# Patient Record
Sex: FEMALE | Race: BLACK OR AFRICAN AMERICAN | NOT HISPANIC OR LATINO | Employment: FULL TIME | URBAN - METROPOLITAN AREA
[De-identification: names, ages, dates, MRNs, and addresses within clinical notes are randomized per-mention and may not be internally consistent; named-entity substitution may affect disease eponyms.]

---

## 2020-05-17 ENCOUNTER — OFFICE VISIT (OUTPATIENT)
Dept: URGENT CARE | Facility: CLINIC | Age: 45
End: 2020-05-17
Payer: COMMERCIAL

## 2020-05-17 VITALS
OXYGEN SATURATION: 100 % | BODY MASS INDEX: 26.29 KG/M2 | SYSTOLIC BLOOD PRESSURE: 117 MMHG | DIASTOLIC BLOOD PRESSURE: 112 MMHG | HEART RATE: 75 BPM | HEIGHT: 64 IN | WEIGHT: 154 LBS | TEMPERATURE: 99 F

## 2020-05-17 DIAGNOSIS — L25.9 CONTACT DERMATITIS, UNSPECIFIED CONTACT DERMATITIS TYPE, UNSPECIFIED TRIGGER: Primary | ICD-10-CM

## 2020-05-17 DIAGNOSIS — U07.1 COVID-19: ICD-10-CM

## 2020-05-17 PROCEDURE — U0003 INFECTIOUS AGENT DETECTION BY NUCLEIC ACID (DNA OR RNA); SEVERE ACUTE RESPIRATORY SYNDROME CORONAVIRUS 2 (SARS-COV-2) (CORONAVIRUS DISEASE [COVID-19]), AMPLIFIED PROBE TECHNIQUE, MAKING USE OF HIGH THROUGHPUT TECHNOLOGIES AS DESCRIBED BY CMS-2020-01-R: HCPCS

## 2020-05-17 PROCEDURE — 99214 PR OFFICE/OUTPT VISIT, EST, LEVL IV, 30-39 MIN: ICD-10-PCS | Mod: S$GLB,,, | Performed by: EMERGENCY MEDICINE

## 2020-05-17 PROCEDURE — 99214 OFFICE O/P EST MOD 30 MIN: CPT | Mod: S$GLB,,, | Performed by: EMERGENCY MEDICINE

## 2020-05-17 RX ORDER — HYDROCORTISONE 25 MG/G
CREAM TOPICAL 2 TIMES DAILY
Qty: 28 G | Refills: 0 | Status: SHIPPED | OUTPATIENT
Start: 2020-05-17 | End: 2020-05-17 | Stop reason: SDUPTHER

## 2020-05-17 RX ORDER — HYDROCORTISONE 25 MG/G
CREAM TOPICAL 2 TIMES DAILY
Qty: 28 G | Refills: 0 | Status: SHIPPED | OUTPATIENT
Start: 2020-05-17 | End: 2020-05-26 | Stop reason: ALTCHOICE

## 2020-05-17 RX ORDER — HYDROXYZINE HYDROCHLORIDE 50 MG/1
50 TABLET, FILM COATED ORAL 3 TIMES DAILY PRN
Qty: 21 TABLET | Refills: 0 | Status: SHIPPED | OUTPATIENT
Start: 2020-05-17 | End: 2020-05-17 | Stop reason: SDUPTHER

## 2020-05-17 RX ORDER — HYDROXYZINE HYDROCHLORIDE 50 MG/1
50 TABLET, FILM COATED ORAL 3 TIMES DAILY PRN
Qty: 21 TABLET | Refills: 0 | Status: SHIPPED | OUTPATIENT
Start: 2020-05-17 | End: 2020-05-24

## 2020-05-17 RX ORDER — METHYLPREDNISOLONE 4 MG/1
TABLET ORAL
Qty: 1 PACKAGE | Refills: 0 | Status: SHIPPED | OUTPATIENT
Start: 2020-05-17 | End: 2023-08-10 | Stop reason: ALTCHOICE

## 2020-05-17 NOTE — LETTER
[address] [place] [address] ? [place], [address] ? Phone [phone] ? Fax [phone] ? [email or web address]          Return to Work/School    Patient: Lluvia Sheehan  YOB: 1975   Date: 05/17/2020      To Whom It May Concern:     Lluvia Sheehan was in contact with/seen in my office on 05/17/2020. COVID-19 is present in our communities across the state. There is limited testing for COVID at this time, so not all patients can be tested. In this situation, your employee meets the following criteria:     Lluvia Sheehan has met the criteria for COVID-19 testing based upon symptoms, travel, and/or potential exposure. The test has been completed and is pending results at this time. During this time the employee is not able to work and should be quarantined per the Centers for Disease Control timelines.      If you have any questions or concerns, or if I can be of further assistance, please do not hesitate to contact me.     Sincerely,      Pedro Nino MD

## 2020-05-17 NOTE — PATIENT INSTRUCTIONS
"WASH THE AREAS WITH ANTIBACTERIAL SOAP LIKE DIAL AND WATER  HYDROXYZINE PRESCRIPTION FOR ITCHING  HYDROCORTISONE 2.5% CREAM PRESCRIPTION TO AFFECTED AREAS  COVID SWAB PENDING  WORK NOTE GIVEN  DO NOT FILL THE MEDROL DOSEPAK UNLESS COVID-19 SWAB IS NEGATIVE AS THE USE OF STEROIDS WITH A POSITIVE COVID-19 TEST CAN BE DETRIMENTAL TO HER HEALTH AND MAKE THINGS WORSE  ONLY FILL THE MEDROL PACK IF COVID IN SWAB NEGATIVE  REVIEW CONTACT DERMATITIS SHEET  REVIEW COVID SHEET  FOLLOW-UP WITH PCP.      Contact Dermatitis  Contact dermatitis is a skin rash caused by something that touches the skin and makes it irritated and inflamed. Your skin may be red, swollen, dry, and may be cracked. Blisters may form and ooze. The rash will itch.  Contact dermatitis can form on the face and neck, backs of hands, forearms, genitals, and lower legs.  People can get contact dermatitis from lots of sources. These include:  · Plants such as poison ivy, oak, or sumac  · Chemicals in hair dyes and rinses, soaps, solvents, waxes, fingernail polish, and deodorants   · Jewelry or watchbands made of nickel  Contact dermatitis is not passed from person to person.  Talk with your healthcare provider about what may have caused the rash. A type of allergy testing called "patch testing" may be used to discover what you are allergic to. You will need to avoid the source of your rash in the future to prevent it from coming back.  Treatment is done to relieve itching and prevent the rash from coming back. The rash should go away in a few days to a few weeks.  Home care  Your healthcare provider may prescribe medicine to relieve swelling and itching. Follow all instructions when using these medicines.  General care:  · Avoid anything that heats up your skin, such as hot showers or baths, or direct sunlight. This can make itching worse.  · Apply cold compresses to soothe your sores to help relieve your symptoms. Do this for 30 minutes 3 to 4 times a day. You " can make a cold compress by soaking a cloth in cold water. Squeeze out excess water. You can add colloidal oatmeal to the water to help reduce itching. For severe itching in a small area, apply an ice pack wrapped in a thin towel. Do this for 20 minutes 3 to 4 times a day.  · You can also try wet dressings. One way to do this is to wear a wet piece of clothing under a dry one. Wear a damp shirt under a dry shirt if your upper body is affected. This can relieve itching and prevent you from scratching the affected area.  · You can also help relieve large areas of itching by taking a lukewarm bath with colloidal oatmeal added to the water.  · Use hydrocortisone cream for redness and irritation, unless another medicine was prescribed. You can also use benzocaine anesthetic cream or spray. Calamine lotion can also relieve mild symptoms.  · Use oral diphenhydramine to help reduce itching. You can buy this antihistamine at drug and grocery stores. It can make you sleepy, so use lower doses during the daytime. Or you can use loratadine. This is an antihistamine that will not make you sleepy. Do not use diphenhydramine if you have glaucoma or have trouble urinating due to an enlarged prostate.  · If a plant causes your rash, make sure to wash your skin and the clothes you were wearing when you came into contact with the plant. This is to wash away the plant oils that gave you the rash and prevent more or worse symptoms.  · Stay away from the substance or object that causes your symptoms. If you cant avoid it, wear gloves or some other type of protection.  Follow-up care  Follow up with your healthcare provider, or as advised.  When to seek medical advice  Call your healthcare provider right away if any of these occur:  · Spreading of the rash to other parts of your body  · Severe swelling of your face, eyelids, mouth, throat or tongue  · Trouble urinating due to swelling in the genital area  · Fever of 100.4°F (38°C) or  higher  · Redness or swelling that gets worse  · Pain that gets worse  · Foul-smelling fluid leaking from the skin  · Yellow-brown crusts on the open blisters  Date Last Reviewed: 9/1/2016  © 7409-3707 [employer]. [address] [place] [address], [place] [address]. All rights reserved. This information is not intended as a substitute for professional medical care. Always follow your healthcare professional's instructions.        Nonspecific Dermatitis  Dermatitis is a skin rash caused by something that touches the skin and makes it irritated and inflamed.  Your skin may be red, swollen, dry, and may be cracked. Blisters may form and ooze. The rash will itch.  Dermatitis can form on the face and neck, backs of hands, forearms, genitals, and lower legs. Dermatitis is not passed from person to person.  Talk with your health care provider about what may have caused the rash. Common things that cause skin allergies are metal in jewelry, plants like poison ivy or poison oak, and certain skin care products. You will need to avoid the source of your rash in the future to prevent it from coming back. In some cases, the cause of the dermatitis may not be found.  Treatment is done to relieve itching and prevent the rash from coming back. The rash should go away in a few days to a few weeks.  Home care  The health care provider may prescribe medications to relieve swelling and itching. Follow all instructions when using these medications.  · Avoid anything that heats up your skin, such as hot showers or baths, or direct sunlight. This can make itching worse.  · Stay away from whatever you think caused the rash.  · Bathe in warm, not hot, water. Apply a moisturizing lotion after bathing to prevent dry skin.  · Avoid skin irritants such as wool or silk clothing, grease, oils, harsh soaps, and detergents.  · Apply cold compresses to soothe your sores to help relieve your symptoms. Do this for 30 minutes 3 to 4 times a day.  You can make a cold compress by soaking a cloth in cold water. Squeeze out excess water. You can add colloidal oatmeal to the water to help reduce itching. For severe itching in a small area, apply an ice pack wrapped in a thin towel. Do this for 20 minutes 3 to 4 times a day.  · You can also help relieve large areas of itching by taking a lukewarm bath with colloidal oatmeal added to the water.  · Use hydrocortisone cream for redness and irritation, unless another medicine was prescribed. You can also use benzocaine anesthetic cream or spray.  · Use oral diphenhydramine to help reduce itching. This is an antihistamine you can buy at drug and grocery stores. It can make you sleepy, so use lower doses during the daytime. Or you can use loratadine. This is an antihistamine that will not make you sleepy. Dont use diphenhydramine if you have glaucoma or have trouble urinating because of an enlarged prostate.  · Wash your hands or use an antibacterial gel often to prevent the spread of the rash.  Follow-up care  Follow up with your health care provider. Make an appointment with your health care provider if your symptoms do not get better in the next 1 to 2 weeks.  When to seek medical advice  Call your health care provider right away if any of these occur:  · Spreading of the rash to other parts of your body  · Severe swelling of your face, eyelids, mouth, throat or tongue  · Trouble urinating due to swelling in the genital area  · Fever of 100.4°F (38°C) or higher  · Redness or swelling that gets worse  · Pain that gets worse  · Foul-smelling fluid leaking from the skin  · Yellow-brown crusts on the open blisters  · Joint pain   Date Last Reviewed: 7/23/2014  © 7229-5657 [employer] [address] [place] [address], [place] [address]. All rights reserved. This information is not intended as a substitute for professional medical care. Always follow your healthcare professional's instructions.      Guidelines for  General Prevention of COVID-19    o Take steps to protect yourself from COVID-19. Perform hand hygiene frequently. Wash your hands often with soap and water for at least 20 seconds of use and alcohol-based hand , covering all surfaces of your hands and rubbing them together until they feel dry.  o Avoid touching your eyes, nose, and mouth with unwashed hands.  o Avoid close contact with people and stay home if youre sick, except to get medical care.   o Cover coughs and sneezes with a tissue, or use the inside of your elbow. Immediately wash your hands or use hand .     For more information, see CDC link below:    https://www.cdc.gov/coronavirus/2019-ncov/hcp/guidance-prevent-spread.html#precautions

## 2020-05-17 NOTE — PROGRESS NOTES
"Subjective:       Patient ID: Lluvia Sheehan is a 45 y.o. female.    Vitals:  height is 5' 4" (1.626 m) and weight is 69.9 kg (154 lb). Her temperature is 99.2 °F (37.3 °C). Her blood pressure is 117/112 (abnormal) and her pulse is 75. Her oxygen saturation is 100%.     Chief Complaint: Rash    PATIENT IS A 45-YEAR-OLD FEMALE WHO WORKS AT THE [employer] AT THE [employer] PRESENTING WITH ITCHING IN SEVERAL SPOTS TO THE RIGHT EAR, LEFT FOREARM, RIGHT FOREARM.  NO SHORTNESS OF BREATH NO WHEEZING NO SWELLING OR THROAT CLOSING.  THE PATIENT IS NOT CHANGED ANYTHING IN HER NORMAL ROUTINE.  SHE DOES WANT TO BE TESTED FOR COVID-19.  NO VESICLES NO DERMATOMAL  DISTRIBUTION.  RASH IS NOT PAINFUL    Rash   This is a new problem. The affected locations include the right ear. The rash is characterized by redness and itchiness. She was exposed to nothing. Pertinent negatives include no cough, fever or sore throat.       Constitution: Negative for chills and fever.   HENT: Negative for facial swelling and sore throat.    Neck: Negative for painful lymph nodes.   Eyes: Negative for eye itching and eyelid swelling.   Respiratory: Negative for cough.    Musculoskeletal: Negative for joint pain and joint swelling.   Skin: Positive for rash and erythema. Negative for color change, pale, wound, abrasion, laceration, lesion, skin thickening/induration, puncture wound, bruising, abscess, avulsion and hives.   Allergic/Immunologic: Negative for environmental allergies, immunocompromised state and hives.   Hematologic/Lymphatic: Negative for swollen lymph nodes.       Objective:      Physical Exam   Constitutional: She is oriented to person, place, and time. She appears well-developed and well-nourished.   HENT:   Head: Normocephalic and atraumatic. Head is without abrasion, without contusion and without laceration.   Left Ear: External ear normal.   Nose: Nose normal.   Mouth/Throat: Oropharynx is clear and moist and mucous membranes are normal. "   MILD ERYTHEMA/PRURITIS RIGHT UPPER EARLOBE  LEFT FOREARM WITH RAISED PRURITIC ERYTHEMATOUS AREA ABOUT 2-3 CM IN SIZE. NO VESICLES, NO CELLULITIS,   Eyes: Pupils are equal, round, and reactive to light. Conjunctivae, EOM and lids are normal.   Neck: Trachea normal, full passive range of motion without pain and phonation normal. Neck supple.   Cardiovascular: Normal rate, regular rhythm and normal heart sounds.   Pulmonary/Chest: Effort normal and breath sounds normal. No stridor. No respiratory distress.   Musculoskeletal: Normal range of motion.   Neurological: She is alert and oriented to person, place, and time.   Skin: Skin is warm, dry, intact and rash. Capillary refill takes less than 2 seconds. Lesions:  erythemaabrasion, burn, bruising and ecchymosis  Psychiatric: She has a normal mood and affect. Her speech is normal. Cognition and memory are normal.   Nursing note and vitals reviewed.        Assessment:       1. Contact dermatitis, unspecified contact dermatitis type, unspecified trigger    2. COVID-19        Plan:         Contact dermatitis, unspecified contact dermatitis type, unspecified trigger    COVID-19  -     COVID-19 Routine Screening    Other orders  -     Discontinue: hydrOXYzine (ATARAX) 50 MG tablet; Take 1 tablet (50 mg total) by mouth 3 (three) times daily as needed for Itching.  Dispense: 21 tablet; Refill: 0  -     Discontinue: hydrocortisone 2.5 % cream; Apply topically 2 (two) times daily. for 10 days  Dispense: 28 g; Refill: 0  -     methylPREDNISolone (MEDROL DOSEPACK) 4 mg tablet; use as directed on package until gone  Dispense: 1 Package; Refill: 0  -     hydrocortisone 2.5 % cream; Apply topically 2 (two) times daily. for 10 days  Dispense: 28 g; Refill: 0  -     hydrOXYzine (ATARAX) 50 MG tablet; Take 1 tablet (50 mg total) by mouth 3 (three) times daily as needed for Itching.  Dispense: 21 tablet; Refill: 0         Patient Instructions   WASH THE AREAS WITH ANTIBACTERIAL SOAP  "LIKE DIAL AND WATER  HYDROXYZINE PRESCRIPTION FOR ITCHING  HYDROCORTISONE 2.5% CREAM PRESCRIPTION TO AFFECTED AREAS  COVID SWAB PENDING  WORK NOTE GIVEN  DO NOT FILL THE MEDROL DOSEPAK UNLESS COVID-19 SWAB IS NEGATIVE AS THE USE OF STEROIDS WITH A POSITIVE COVID-19 TEST CAN BE DETRIMENTAL TO HER HEALTH AND MAKE THINGS WORSE  ONLY FILL THE MEDROL PACK IF COVID IN SWAB NEGATIVE  REVIEW CONTACT DERMATITIS SHEET  REVIEW COVID SHEET  FOLLOW-UP WITH PCP.      Contact Dermatitis  Contact dermatitis is a skin rash caused by something that touches the skin and makes it irritated and inflamed. Your skin may be red, swollen, dry, and may be cracked. Blisters may form and ooze. The rash will itch.  Contact dermatitis can form on the face and neck, backs of hands, forearms, genitals, and lower legs.  People can get contact dermatitis from lots of sources. These include:  · Plants such as poison ivy, oak, or sumac  · Chemicals in hair dyes and rinses, soaps, solvents, waxes, fingernail polish, and deodorants   · Jewelry or watchbands made of nickel  Contact dermatitis is not passed from person to person.  Talk with your healthcare provider about what may have caused the rash. A type of allergy testing called "patch testing" may be used to discover what you are allergic to. You will need to avoid the source of your rash in the future to prevent it from coming back.  Treatment is done to relieve itching and prevent the rash from coming back. The rash should go away in a few days to a few weeks.  Home care  Your healthcare provider may prescribe medicine to relieve swelling and itching. Follow all instructions when using these medicines.  General care:  · Avoid anything that heats up your skin, such as hot showers or baths, or direct sunlight. This can make itching worse.  · Apply cold compresses to soothe your sores to help relieve your symptoms. Do this for 30 minutes 3 to 4 times a day. You can make a cold compress by soaking a " cloth in cold water. Squeeze out excess water. You can add colloidal oatmeal to the water to help reduce itching. For severe itching in a small area, apply an ice pack wrapped in a thin towel. Do this for 20 minutes 3 to 4 times a day.  · You can also try wet dressings. One way to do this is to wear a wet piece of clothing under a dry one. Wear a damp shirt under a dry shirt if your upper body is affected. This can relieve itching and prevent you from scratching the affected area.  · You can also help relieve large areas of itching by taking a lukewarm bath with colloidal oatmeal added to the water.  · Use hydrocortisone cream for redness and irritation, unless another medicine was prescribed. You can also use benzocaine anesthetic cream or spray. Calamine lotion can also relieve mild symptoms.  · Use oral diphenhydramine to help reduce itching. You can buy this antihistamine at drug and grocery stores. It can make you sleepy, so use lower doses during the daytime. Or you can use loratadine. This is an antihistamine that will not make you sleepy. Do not use diphenhydramine if you have glaucoma or have trouble urinating due to an enlarged prostate.  · If a plant causes your rash, make sure to wash your skin and the clothes you were wearing when you came into contact with the plant. This is to wash away the plant oils that gave you the rash and prevent more or worse symptoms.  · Stay away from the substance or object that causes your symptoms. If you cant avoid it, wear gloves or some other type of protection.  Follow-up care  Follow up with your healthcare provider, or as advised.  When to seek medical advice  Call your healthcare provider right away if any of these occur:  · Spreading of the rash to other parts of your body  · Severe swelling of your face, eyelids, mouth, throat or tongue  · Trouble urinating due to swelling in the genital area  · Fever of 100.4°F (38°C) or higher  · Redness or swelling that gets  worse  · Pain that gets worse  · Foul-smelling fluid leaking from the skin  · Yellow-brown crusts on the open blisters  Date Last Reviewed: 9/1/2016  © 9714-0580 [employer]. [address] [place] [address], [place] [address]. All rights reserved. This information is not intended as a substitute for professional medical care. Always follow your healthcare professional's instructions.        Nonspecific Dermatitis  Dermatitis is a skin rash caused by something that touches the skin and makes it irritated and inflamed.  Your skin may be red, swollen, dry, and may be cracked. Blisters may form and ooze. The rash will itch.  Dermatitis can form on the face and neck, backs of hands, forearms, genitals, and lower legs. Dermatitis is not passed from person to person.  Talk with your health care provider about what may have caused the rash. Common things that cause skin allergies are metal in jewelry, plants like poison ivy or poison oak, and certain skin care products. You will need to avoid the source of your rash in the future to prevent it from coming back. In some cases, the cause of the dermatitis may not be found.  Treatment is done to relieve itching and prevent the rash from coming back. The rash should go away in a few days to a few weeks.  Home care  The health care provider may prescribe medications to relieve swelling and itching. Follow all instructions when using these medications.  · Avoid anything that heats up your skin, such as hot showers or baths, or direct sunlight. This can make itching worse.  · Stay away from whatever you think caused the rash.  · Bathe in warm, not hot, water. Apply a moisturizing lotion after bathing to prevent dry skin.  · Avoid skin irritants such as wool or silk clothing, grease, oils, harsh soaps, and detergents.  · Apply cold compresses to soothe your sores to help relieve your symptoms. Do this for 30 minutes 3 to 4 times a day. You can make a cold compress by soaking a  cloth in cold water. Squeeze out excess water. You can add colloidal oatmeal to the water to help reduce itching. For severe itching in a small area, apply an ice pack wrapped in a thin towel. Do this for 20 minutes 3 to 4 times a day.  · You can also help relieve large areas of itching by taking a lukewarm bath with colloidal oatmeal added to the water.  · Use hydrocortisone cream for redness and irritation, unless another medicine was prescribed. You can also use benzocaine anesthetic cream or spray.  · Use oral diphenhydramine to help reduce itching. This is an antihistamine you can buy at drug and grocery stores. It can make you sleepy, so use lower doses during the daytime. Or you can use loratadine. This is an antihistamine that will not make you sleepy. Dont use diphenhydramine if you have glaucoma or have trouble urinating because of an enlarged prostate.  · Wash your hands or use an antibacterial gel often to prevent the spread of the rash.  Follow-up care  Follow up with your health care provider. Make an appointment with your health care provider if your symptoms do not get better in the next 1 to 2 weeks.  When to seek medical advice  Call your health care provider right away if any of these occur:  · Spreading of the rash to other parts of your body  · Severe swelling of your face, eyelids, mouth, throat or tongue  · Trouble urinating due to swelling in the genital area  · Fever of 100.4°F (38°C) or higher  · Redness or swelling that gets worse  · Pain that gets worse  · Foul-smelling fluid leaking from the skin  · Yellow-brown crusts on the open blisters  · Joint pain   Date Last Reviewed: 7/23/2014  © 2246-1299 [employer]. [address] [place] [address], [place] [address]. All rights reserved. This information is not intended as a substitute for professional medical care. Always follow your healthcare professional's instructions.      Guidelines for General Prevention of COVID-19    o Take  steps to protect yourself from COVID-19. Perform hand hygiene frequently. Wash your hands often with soap and water for at least 20 seconds of use and alcohol-based hand , covering all surfaces of your hands and rubbing them together until they feel dry.  o Avoid touching your eyes, nose, and mouth with unwashed hands.  o Avoid close contact with people and stay home if youre sick, except to get medical care.   o Cover coughs and sneezes with a tissue, or use the inside of your elbow. Immediately wash your hands or use hand .     For more information, see CDC link below:    https://www.cdc.gov/coronavirus/2019-ncov/hcp/guidance-prevent-spread.html#precautions

## 2020-05-18 LAB — SARS-COV-2 RNA RESP QL NAA+PROBE: NOT DETECTED

## 2020-05-19 ENCOUNTER — TELEPHONE (OUTPATIENT)
Dept: URGENT CARE | Facility: CLINIC | Age: 45
End: 2020-05-19

## 2020-05-19 NOTE — TELEPHONE ENCOUNTER
PATIENT AWARE OF NEGATIVE COVID 19 TEST RESULTS AS SHE HAS SEEN ON 5/19/20 AT 10:43    CALLED AND ANSWERED BUT NO RESPONSE ON PHONE.    BLPMD  14:26  5/19/20

## 2020-05-26 ENCOUNTER — HOSPITAL ENCOUNTER (EMERGENCY)
Facility: OTHER | Age: 45
Discharge: HOME | End: 2020-05-26
Attending: EMERGENCY MEDICINE
Payer: COMMERCIAL

## 2020-05-26 VITALS
SYSTOLIC BLOOD PRESSURE: 148 MMHG | HEART RATE: 82 BPM | RESPIRATION RATE: 17 BRPM | HEIGHT: 64 IN | BODY MASS INDEX: 27.31 KG/M2 | OXYGEN SATURATION: 100 % | DIASTOLIC BLOOD PRESSURE: 94 MMHG | WEIGHT: 160 LBS | TEMPERATURE: 98 F

## 2020-05-26 DIAGNOSIS — L03.311 CELLULITIS OF ABDOMINAL WALL: ICD-10-CM

## 2020-05-26 DIAGNOSIS — K52.9 ENTERITIS: Primary | ICD-10-CM

## 2020-05-26 DIAGNOSIS — L25.9 CONTACT DERMATITIS, UNSPECIFIED CONTACT DERMATITIS TYPE, UNSPECIFIED TRIGGER: ICD-10-CM

## 2020-05-26 LAB
ALBUMIN SERPL BCP-MCNC: 3.9 G/DL (ref 3.5–5.2)
ALP SERPL-CCNC: 130 U/L (ref 55–135)
ALT SERPL W/O P-5'-P-CCNC: 26 U/L (ref 10–44)
ANION GAP SERPL CALC-SCNC: 11 MMOL/L (ref 8–16)
AST SERPL-CCNC: 20 U/L (ref 10–40)
BASOPHILS # BLD AUTO: 0.02 K/UL (ref 0–0.2)
BASOPHILS NFR BLD: 0.2 % (ref 0–1.9)
BILIRUB SERPL-MCNC: 0.4 MG/DL (ref 0.1–1)
BILIRUB UR QL STRIP: NEGATIVE
BUN SERPL-MCNC: 13 MG/DL (ref 6–20)
CALCIUM SERPL-MCNC: 9.1 MG/DL (ref 8.7–10.5)
CHLORIDE SERPL-SCNC: 107 MMOL/L (ref 95–110)
CLARITY UR: CLEAR
CO2 SERPL-SCNC: 23 MMOL/L (ref 23–29)
COLOR UR: YELLOW
CREAT SERPL-MCNC: 0.7 MG/DL (ref 0.5–1.4)
DIFFERENTIAL METHOD: ABNORMAL
EOSINOPHIL # BLD AUTO: 0.1 K/UL (ref 0–0.5)
EOSINOPHIL NFR BLD: 0.9 % (ref 0–8)
ERYTHROCYTE [DISTWIDTH] IN BLOOD BY AUTOMATED COUNT: 12.4 % (ref 11.5–14.5)
EST. GFR  (AFRICAN AMERICAN): >60 ML/MIN/1.73 M^2
EST. GFR  (NON AFRICAN AMERICAN): >60 ML/MIN/1.73 M^2
GLUCOSE SERPL-MCNC: 108 MG/DL (ref 70–110)
GLUCOSE UR QL STRIP: NEGATIVE
HCT VFR BLD AUTO: 37.7 % (ref 37–48.5)
HGB BLD-MCNC: 12.1 G/DL (ref 12–16)
HGB UR QL STRIP: NEGATIVE
IMM GRANULOCYTES # BLD AUTO: 0.04 K/UL (ref 0–0.04)
IMM GRANULOCYTES NFR BLD AUTO: 0.3 % (ref 0–0.5)
KETONES UR QL STRIP: NEGATIVE
LEUKOCYTE ESTERASE UR QL STRIP: NEGATIVE
LIPASE SERPL-CCNC: 23 U/L (ref 4–60)
LYMPHOCYTES # BLD AUTO: 0.9 K/UL (ref 1–4.8)
LYMPHOCYTES NFR BLD: 7.4 % (ref 18–48)
MCH RBC QN AUTO: 30.1 PG (ref 27–31)
MCHC RBC AUTO-ENTMCNC: 32.1 G/DL (ref 32–36)
MCV RBC AUTO: 94 FL (ref 82–98)
MONOCYTES # BLD AUTO: 0.4 K/UL (ref 0.3–1)
MONOCYTES NFR BLD: 2.8 % (ref 4–15)
NEUTROPHILS # BLD AUTO: 10.9 K/UL (ref 1.8–7.7)
NEUTROPHILS NFR BLD: 88.4 % (ref 38–73)
NITRITE UR QL STRIP: NEGATIVE
NRBC BLD-RTO: 0 /100 WBC
PH UR STRIP: 6 [PH] (ref 5–8)
PLATELET # BLD AUTO: 287 K/UL (ref 150–350)
PMV BLD AUTO: 10.2 FL (ref 9.2–12.9)
POTASSIUM SERPL-SCNC: 3.7 MMOL/L (ref 3.5–5.1)
PROT SERPL-MCNC: 8 G/DL (ref 6–8.4)
PROT UR QL STRIP: NEGATIVE
RBC # BLD AUTO: 4.02 M/UL (ref 4–5.4)
SARS-COV-2 RDRP RESP QL NAA+PROBE: NEGATIVE
SODIUM SERPL-SCNC: 141 MMOL/L (ref 136–145)
SP GR UR STRIP: 1.02 (ref 1–1.03)
URN SPEC COLLECT METH UR: NORMAL
UROBILINOGEN UR STRIP-ACNC: NEGATIVE EU/DL
WBC # BLD AUTO: 12.34 K/UL (ref 3.9–12.7)

## 2020-05-26 PROCEDURE — 96374 THER/PROPH/DIAG INJ IV PUSH: CPT

## 2020-05-26 PROCEDURE — 81003 URINALYSIS AUTO W/O SCOPE: CPT

## 2020-05-26 PROCEDURE — 99284 EMERGENCY DEPT VISIT MOD MDM: CPT | Mod: 25

## 2020-05-26 PROCEDURE — 85025 COMPLETE CBC W/AUTO DIFF WBC: CPT

## 2020-05-26 PROCEDURE — 80053 COMPREHEN METABOLIC PANEL: CPT

## 2020-05-26 PROCEDURE — 83690 ASSAY OF LIPASE: CPT

## 2020-05-26 PROCEDURE — 63600175 PHARM REV CODE 636 W HCPCS: Performed by: NURSE PRACTITIONER

## 2020-05-26 PROCEDURE — 25000003 PHARM REV CODE 250: Performed by: NURSE PRACTITIONER

## 2020-05-26 PROCEDURE — 96361 HYDRATE IV INFUSION ADD-ON: CPT

## 2020-05-26 PROCEDURE — U0002 COVID-19 LAB TEST NON-CDC: HCPCS

## 2020-05-26 RX ORDER — HYDROCORTISONE 25 MG/ML
LOTION TOPICAL 2 TIMES DAILY
Qty: 60 ML | Refills: 0 | Status: SHIPPED | OUTPATIENT
Start: 2020-05-26 | End: 2023-08-10 | Stop reason: ALTCHOICE

## 2020-05-26 RX ORDER — NEOMYCIN SULFATE, POLYMYXIN B SULFATE, BACITRACIN ZINC, HYDROCORTISONE 3.5; 10000; 400; 1 MG/G; [USP'U]/G; [USP'U]/G; MG/G
OINTMENT OPHTHALMIC 2 TIMES DAILY
Qty: 3.5 G | Refills: 0 | Status: SHIPPED | OUTPATIENT
Start: 2020-05-26 | End: 2020-05-26 | Stop reason: CLARIF

## 2020-05-26 RX ORDER — ONDANSETRON 4 MG/1
4 TABLET, FILM COATED ORAL EVERY 6 HOURS
Qty: 12 TABLET | Refills: 0 | Status: SHIPPED | OUTPATIENT
Start: 2020-05-26 | End: 2023-12-13

## 2020-05-26 RX ORDER — SULFAMETHOXAZOLE AND TRIMETHOPRIM 800; 160 MG/1; MG/1
1 TABLET ORAL 2 TIMES DAILY
Qty: 14 TABLET | Refills: 0 | Status: SHIPPED | OUTPATIENT
Start: 2020-05-26 | End: 2020-06-02

## 2020-05-26 RX ORDER — KETOROLAC TROMETHAMINE 30 MG/ML
15 INJECTION, SOLUTION INTRAMUSCULAR; INTRAVENOUS
Status: COMPLETED | OUTPATIENT
Start: 2020-05-26 | End: 2020-05-26

## 2020-05-26 RX ORDER — DICYCLOMINE HYDROCHLORIDE 20 MG/1
20 TABLET ORAL 2 TIMES DAILY
Qty: 10 TABLET | Refills: 0 | Status: SHIPPED | OUTPATIENT
Start: 2020-05-26 | End: 2020-05-31

## 2020-05-26 RX ADMIN — KETOROLAC TROMETHAMINE 15 MG: 30 INJECTION, SOLUTION INTRAMUSCULAR; INTRAVENOUS at 05:05

## 2020-05-26 RX ADMIN — SODIUM CHLORIDE 1000 ML: 0.9 INJECTION, SOLUTION INTRAVENOUS at 05:05

## 2020-05-26 NOTE — DISCHARGE INSTRUCTIONS
Your labs are unremarkable.  Urine is negative.  We are going to treat you for cellulitis here abdomen.  Will also give you medication for nausea.  Increase fluids and rest.  [place] diet.  Follow up with her PCP as needed.    Our goal in the emergency department is to always give you outstanding care and exceptional service. You may receive a survey by mail or e-mail in the next week regarding your experience in our ED. We would greatly appreciate your completing and returning the survey. Your feedback provides us with a way to recognize our staff who give very good care and it helps us learn how to improve when your experience was below our aspiration of excellence.

## 2020-05-26 NOTE — ED NOTES
Patient Identifiers for Lluvia Sheehan checked and correct  LOC: The patient is awake, alert and aware of environment with an appropriate affect, the patient is oriented x 3 and speaking appropriate.  APPEARANCE: Patient resting comfortably and in no acute distress, patient is clean and well groomed, patient's clothing is properly fastened.  SKIN: The skin is warm and dry, patient has normal skin turgor and moist mucus membranes,no rashes or lesions.Skin Intact , No Breakdown Noted  Musculoskeletal :  Normal range of motion noted. Moves all extremeties well, No swelling or tenderness noted  RESPIRATORY: Airway is open and patent, respirations are spontaneous, patient has a normal effort and rate.  CARDIAC: Patient has a normal rate and rhythm, no periphreal edema noted, capillary refill < 3 seconds.   ABDOMEN: Soft and reddened swollen area to middle of abd with surrounding redness, +blanching, reporting lower abd. cramping Bowels Sounds are +  PULSES: 2+  And symmetrical in all extremeties  NEUROLOGIC: PERRL,.The patient is awake, alert and cooperative with a calm affect, patient is aware of environment.    Will continue to monitor

## 2020-05-26 NOTE — ED PROVIDER NOTES
Encounter Date: 2020       History     Chief Complaint   Patient presents with    Abdominal Pain     pt with crampy lower abdominal pain  x 2 days. pt with nausea and frequent soft stools.      Patient is a 45-year-old female with medical history of hypertension presenting to the ED for generalized abdominal pain since last night.  Patient states that she had loose stools yesterday.  Patient describes the pain as cramping.  Patient denies taking anything for her pain.  Patient denies any vomiting, chest pain or shortness of breath.    The history is provided by the patient.     Review of patient's allergies indicates:  No Known Allergies  Past Medical History:   Diagnosis Date    Hypertension      Past Surgical History:   Procedure Laterality Date     SECTION      x2    MINI-LAPAROTOMY      x3 for ruptured ectopic pregnancies    SALPINGECTOMY      SALPINGECTOMY       Family History   Problem Relation Age of Onset    Breast cancer Neg Hx     Colon cancer Neg Hx     Ovarian cancer Neg Hx      Social History     Tobacco Use    Smoking status: Never Smoker    Smokeless tobacco: Never Used   Substance Use Topics    Alcohol use: No    Drug use: No     Review of Systems   Constitutional: Positive for appetite change. Negative for activity change, chills, fatigue and fever.   HENT: Negative for sore throat.    Respiratory: Negative for cough, chest tightness, shortness of breath and wheezing.    Cardiovascular: Negative for chest pain and palpitations.   Gastrointestinal: Positive for abdominal pain ( Cramping), diarrhea ( Soft stool) and nausea. Negative for constipation and vomiting.   Genitourinary: Negative for decreased urine volume, difficulty urinating, dysuria, flank pain, frequency and urgency.   Musculoskeletal: Negative for back pain.   Skin: Positive for color change ( Epigastric area) and wound ( Epigastric area). Negative for rash.   Neurological: Negative for weakness.    Hematological: Does not bruise/bleed easily.   All other systems reviewed and are negative.      Physical Exam     Initial Vitals [05/26/20 1633]   BP Pulse Resp Temp SpO2   (!) 173/108 79 18 98.4 °F (36.9 °C) 98 %      MAP       --         Physical Exam    Nursing note and vitals reviewed.  Constitutional: Vital signs are normal. She appears well-developed and well-nourished. She is cooperative. No distress.   HENT:   Head: Normocephalic and atraumatic.   Mouth/Throat: Uvula is midline, oropharynx is clear and moist and mucous membranes are normal.   Eyes: Conjunctivae, EOM and lids are normal. Pupils are equal, round, and reactive to light.   Neck: Trachea normal, normal range of motion and phonation normal. Neck supple.   Cardiovascular: Normal rate, regular rhythm and intact distal pulses.   Pulses:       Radial pulses are 2+ on the right side, and 2+ on the left side.   Pulmonary/Chest: Effort normal and breath sounds normal.   Abdominal: Soft. Normal appearance and bowel sounds are normal. There is generalized tenderness. There is no rigidity, no rebound, no guarding, no tenderness at McBurney's point and negative Kaplan's sign.       Neurological: She is alert and oriented to person, place, and time. She has normal strength. No sensory deficit. GCS eye subscore is 4. GCS verbal subscore is 5. GCS motor subscore is 6.   Skin: Skin is warm, dry and intact. Capillary refill takes 2 to 3 seconds. Abscess ( Upper abdomen) noted. No pallor.         ED Course   Procedures  Labs Reviewed   CBC W/ AUTO DIFFERENTIAL - Abnormal; Notable for the following components:       Result Value    Gran # (ANC) 10.9 (*)     Lymph # 0.9 (*)     Gran% 88.4 (*)     Lymph% 7.4 (*)     Mono% 2.8 (*)     All other components within normal limits   COMPREHENSIVE METABOLIC PANEL   LIPASE   URINALYSIS, REFLEX TO URINE CULTURE    Narrative:     Preferred Collection Type->Urine, Clean Catch   SARS-COV-2 RNA AMPLIFICATION, QUAL           Imaging Results    None          Medical Decision Making:   Initial Assessment:     Emergent evaluation 45-year-old female presenting to the ED for intermittent lower abdomen cramping and nausea for the past 2 days.  Patient also states she was bit by an insect on Sunday while outside and has surrounding erythema since that time.  On exam patient is A&O x3.  Vital signs stable.  Not febrile and nontoxic appearing.  Breath sounds clear bilaterally.  Insert but noted to upper mid epigastric area.  Surrounding erythema and warmth noted.   Generalized abdominal tenderness to palpation noted.  No rebound or guarding appreciated.  Bowel sounds within normal limits.  Cap refill less than 2. Strength 5/5 in all extremities.  Differential Diagnosis:   Differential diagnoses include but are not limited to enteritis, colitis, diverticulitis, cellulitis, insect bites, others.   Clinical Tests:   Lab Tests: Ordered and Reviewed  The following lab test(s) were unremarkable: CBC, CMP, UPT and Urinalysis       <> Summary of Lab: CBC unremarkable.  No leukocytosis noted.  H&H stable at 12.1 and 37.7.  CMP unremarkable.  Lipase negative.  COVID negative.  UA negative for any acute infectious process.  Urine preg negative.  ED Management:    I will get labs, hydrate, medicate and reassess.    I discussed this case with my supervising physician.      Labs unremarkable.  Patient reassessed after medications and fluids.  Patient states feeling much better.  Patient updated on lab results.  Advised increase fluids and rest.  Triangle diet.   Will prescribe Bentyl for abdominal cramping and Zofran for nausea.  Will prescribe antibiotics for possible cellulitis.  Keep area clean and dry.  Follow up with PCP as needed.  Patient verbalized understanding of this plan of care.  All questions and concerns addressed.    Patient is hemodynamically stable, vital signs are normal. Discharge instructions given. Return to ED precautions discussed.  Follow up as directed. Pt verbalized understanding of this plan.  Pt is stable for discharge.                                  Clinical Impression:       ICD-10-CM ICD-9-CM   1. Enteritis K52.9 558.9   2. Contact dermatitis, unspecified contact dermatitis type, unspecified trigger L25.9 692.9   3. Cellulitis of abdominal wall L03.311 682.2         Disposition:   Disposition: Discharged  Condition: Stable     ED Disposition Condition    Discharge Stable        ED Prescriptions     Medication Sig Dispense Start Date End Date Auth. Provider    dicyclomine (BENTYL) 20 mg tablet Take 1 tablet (20 mg total) by mouth 2 (two) times daily. for 5 days 10 tablet 5/26/2020 5/31/2020 Kimberly Grullon NP    ondansetron (ZOFRAN) 4 MG tablet Take 1 tablet (4 mg total) by mouth every 6 (six) hours. 12 tablet 5/26/2020  Kimberly Grullon NP    sulfamethoxazole-trimethoprim 800-160mg (BACTRIM DS) 800-160 mg Tab Take 1 tablet by mouth 2 (two) times daily. for 7 days 14 tablet 5/26/2020 6/2/2020 Kimberly Grullon NP    neomycin-bacitracin-polymyxin-hydrocortisone (CORTISPORIN) ophthalmic ointment  (Status: Discontinued) Place into both eyes 2 (two) times daily. for 5 days 3.5 g 5/26/2020 5/26/2020 Kimberly Grullon NP    hydrocortisone 2.5 % lotion Apply topically 2 (two) times daily. 60 mL 5/26/2020  Kimberly Grullon NP        Follow-up Information     Follow up With Specialties Details Why Contact Info    Regis Lombardo MD Internal Medicine Schedule an appointment as soon as possible for a visit  As needed [id number] [facility]  [place] [address]  [phone]                                       Kimberly Grullon NP  05/26/20 8823

## 2020-12-22 ENCOUNTER — LAB VISIT (OUTPATIENT)
Dept: LAB | Facility: OTHER | Age: 45
End: 2020-12-22
Payer: OTHER GOVERNMENT

## 2020-12-22 DIAGNOSIS — Z03.818 ENCOUNTER FOR OBSERVATION FOR SUSPECTED EXPOSURE TO OTHER BIOLOGICAL AGENTS RULED OUT: ICD-10-CM

## 2020-12-22 PROCEDURE — U0003 INFECTIOUS AGENT DETECTION BY NUCLEIC ACID (DNA OR RNA); SEVERE ACUTE RESPIRATORY SYNDROME CORONAVIRUS 2 (SARS-COV-2) (CORONAVIRUS DISEASE [COVID-19]), AMPLIFIED PROBE TECHNIQUE, MAKING USE OF HIGH THROUGHPUT TECHNOLOGIES AS DESCRIBED BY CMS-2020-01-R: HCPCS

## 2020-12-23 LAB — SARS-COV-2 RNA RESP QL NAA+PROBE: NOT DETECTED

## 2021-04-15 ENCOUNTER — PATIENT MESSAGE (OUTPATIENT)
Dept: RESEARCH | Facility: HOSPITAL | Age: 46
End: 2021-04-15

## 2021-12-26 ENCOUNTER — LAB VISIT (OUTPATIENT)
Dept: PRIMARY CARE CLINIC | Facility: OTHER | Age: 46
End: 2021-12-26
Payer: OTHER GOVERNMENT

## 2021-12-26 ENCOUNTER — PATIENT MESSAGE (OUTPATIENT)
Dept: ADMINISTRATIVE | Facility: OTHER | Age: 46
End: 2021-12-26
Payer: OTHER GOVERNMENT

## 2021-12-26 DIAGNOSIS — M79.10 MUSCLE PAIN: ICD-10-CM

## 2021-12-26 DIAGNOSIS — R05.9 COUGH: ICD-10-CM

## 2021-12-26 PROCEDURE — U0003 INFECTIOUS AGENT DETECTION BY NUCLEIC ACID (DNA OR RNA); SEVERE ACUTE RESPIRATORY SYNDROME CORONAVIRUS 2 (SARS-COV-2) (CORONAVIRUS DISEASE [COVID-19]), AMPLIFIED PROBE TECHNIQUE, MAKING USE OF HIGH THROUGHPUT TECHNOLOGIES AS DESCRIBED BY CMS-2020-01-R: HCPCS | Performed by: INTERNAL MEDICINE

## 2021-12-27 ENCOUNTER — PATIENT MESSAGE (OUTPATIENT)
Dept: FAMILY MEDICINE | Facility: CLINIC | Age: 46
End: 2021-12-27
Payer: OTHER GOVERNMENT

## 2021-12-27 LAB
SARS-COV-2 RNA RESP QL NAA+PROBE: NOT DETECTED
SARS-COV-2- CYCLE NUMBER: NORMAL

## 2022-02-21 ENCOUNTER — LAB VISIT (OUTPATIENT)
Dept: PRIMARY CARE CLINIC | Facility: OTHER | Age: 47
End: 2022-02-21
Attending: INTERNAL MEDICINE
Payer: OTHER GOVERNMENT

## 2022-02-21 DIAGNOSIS — Z20.822 ENCOUNTER FOR LABORATORY TESTING FOR COVID-19 VIRUS: ICD-10-CM

## 2022-02-21 PROCEDURE — U0003 INFECTIOUS AGENT DETECTION BY NUCLEIC ACID (DNA OR RNA); SEVERE ACUTE RESPIRATORY SYNDROME CORONAVIRUS 2 (SARS-COV-2) (CORONAVIRUS DISEASE [COVID-19]), AMPLIFIED PROBE TECHNIQUE, MAKING USE OF HIGH THROUGHPUT TECHNOLOGIES AS DESCRIBED BY CMS-2020-01-R: HCPCS | Performed by: INTERNAL MEDICINE

## 2022-02-22 LAB
SARS-COV-2 RNA RESP QL NAA+PROBE: NOT DETECTED
SARS-COV-2- CYCLE NUMBER: NORMAL

## 2022-03-14 ENCOUNTER — LAB VISIT (OUTPATIENT)
Dept: PRIMARY CARE CLINIC | Facility: OTHER | Age: 47
End: 2022-03-14
Payer: OTHER GOVERNMENT

## 2022-03-14 DIAGNOSIS — Z20.822 ENCOUNTER FOR LABORATORY TESTING FOR COVID-19 VIRUS: ICD-10-CM

## 2022-03-14 PROCEDURE — U0003 INFECTIOUS AGENT DETECTION BY NUCLEIC ACID (DNA OR RNA); SEVERE ACUTE RESPIRATORY SYNDROME CORONAVIRUS 2 (SARS-COV-2) (CORONAVIRUS DISEASE [COVID-19]), AMPLIFIED PROBE TECHNIQUE, MAKING USE OF HIGH THROUGHPUT TECHNOLOGIES AS DESCRIBED BY CMS-2020-01-R: HCPCS | Performed by: INTERNAL MEDICINE

## 2022-03-15 LAB
SARS-COV-2 RNA RESP QL NAA+PROBE: NOT DETECTED
SARS-COV-2- CYCLE NUMBER: NORMAL

## 2022-04-25 ENCOUNTER — LAB VISIT (OUTPATIENT)
Dept: PRIMARY CARE CLINIC | Facility: OTHER | Age: 47
End: 2022-04-25
Payer: COMMERCIAL

## 2022-04-25 DIAGNOSIS — Z20.822 ENCOUNTER FOR LABORATORY TESTING FOR COVID-19 VIRUS: ICD-10-CM

## 2022-04-25 PROCEDURE — U0003 INFECTIOUS AGENT DETECTION BY NUCLEIC ACID (DNA OR RNA); SEVERE ACUTE RESPIRATORY SYNDROME CORONAVIRUS 2 (SARS-COV-2) (CORONAVIRUS DISEASE [COVID-19]), AMPLIFIED PROBE TECHNIQUE, MAKING USE OF HIGH THROUGHPUT TECHNOLOGIES AS DESCRIBED BY CMS-2020-01-R: HCPCS | Performed by: INTERNAL MEDICINE

## 2022-04-26 LAB
SARS-COV-2 RNA RESP QL NAA+PROBE: NOT DETECTED
SARS-COV-2- CYCLE NUMBER: NORMAL

## 2022-06-15 ENCOUNTER — LAB VISIT (OUTPATIENT)
Dept: PRIMARY CARE CLINIC | Facility: OTHER | Age: 47
End: 2022-06-15
Payer: COMMERCIAL

## 2022-06-15 DIAGNOSIS — Z20.822 ENCOUNTER FOR LABORATORY TESTING FOR COVID-19 VIRUS: ICD-10-CM

## 2022-06-15 PROCEDURE — U0003 INFECTIOUS AGENT DETECTION BY NUCLEIC ACID (DNA OR RNA); SEVERE ACUTE RESPIRATORY SYNDROME CORONAVIRUS 2 (SARS-COV-2) (CORONAVIRUS DISEASE [COVID-19]), AMPLIFIED PROBE TECHNIQUE, MAKING USE OF HIGH THROUGHPUT TECHNOLOGIES AS DESCRIBED BY CMS-2020-01-R: HCPCS | Performed by: INTERNAL MEDICINE

## 2022-06-16 LAB
SARS-COV-2 RNA RESP QL NAA+PROBE: NOT DETECTED
SARS-COV-2- CYCLE NUMBER: NORMAL

## 2023-07-30 PROBLEM — I50.9 ACUTE CONGESTIVE HEART FAILURE: Status: ACTIVE | Noted: 2023-07-30

## 2023-07-30 PROBLEM — E87.79 VOLUME OVERLOAD STATE OF HEART: Status: ACTIVE | Noted: 2023-07-30

## 2023-07-30 PROBLEM — J81.0 ACUTE PULMONARY EDEMA: Status: ACTIVE | Noted: 2023-07-30

## 2023-07-31 PROBLEM — J81.0 ACUTE PULMONARY EDEMA: Status: RESOLVED | Noted: 2023-07-30 | Resolved: 2023-07-31

## 2023-07-31 PROBLEM — E87.79 VOLUME OVERLOAD STATE OF HEART: Status: RESOLVED | Noted: 2023-07-30 | Resolved: 2023-07-31

## 2023-07-31 PROBLEM — I50.9 ACUTE CONGESTIVE HEART FAILURE: Status: RESOLVED | Noted: 2023-07-30 | Resolved: 2023-07-31

## 2023-08-10 ENCOUNTER — OFFICE VISIT (OUTPATIENT)
Dept: CARDIOLOGY | Facility: CLINIC | Age: 48
End: 2023-08-10
Payer: COMMERCIAL

## 2023-08-10 VITALS
HEIGHT: 64 IN | BODY MASS INDEX: 25.13 KG/M2 | HEART RATE: 64 BPM | WEIGHT: 147.19 LBS | OXYGEN SATURATION: 99 % | SYSTOLIC BLOOD PRESSURE: 168 MMHG | DIASTOLIC BLOOD PRESSURE: 100 MMHG

## 2023-08-10 DIAGNOSIS — I50.30 HEART FAILURE WITH PRESERVED EJECTION FRACTION, UNSPECIFIED HF CHRONICITY: ICD-10-CM

## 2023-08-10 DIAGNOSIS — I16.0 HYPERTENSIVE URGENCY: ICD-10-CM

## 2023-08-10 DIAGNOSIS — I10 HYPERTENSION, UNSPECIFIED TYPE: ICD-10-CM

## 2023-08-10 PROCEDURE — 99214 PR OFFICE/OUTPT VISIT, EST, LEVL IV, 30-39 MIN: ICD-10-PCS | Mod: S$GLB,,, | Performed by: INTERNAL MEDICINE

## 2023-08-10 PROCEDURE — 99999 PR PBB SHADOW E&M-EST. PATIENT-LVL III: ICD-10-PCS | Mod: PBBFAC,,, | Performed by: INTERNAL MEDICINE

## 2023-08-10 PROCEDURE — 1160F RVW MEDS BY RX/DR IN RCRD: CPT | Mod: CPTII,S$GLB,, | Performed by: INTERNAL MEDICINE

## 2023-08-10 PROCEDURE — 99214 OFFICE O/P EST MOD 30 MIN: CPT | Mod: S$GLB,,, | Performed by: INTERNAL MEDICINE

## 2023-08-10 PROCEDURE — 4010F PR ACE/ARB THEARPY RXD/TAKEN: ICD-10-PCS | Mod: CPTII,S$GLB,, | Performed by: INTERNAL MEDICINE

## 2023-08-10 PROCEDURE — 4010F ACE/ARB THERAPY RXD/TAKEN: CPT | Mod: CPTII,S$GLB,, | Performed by: INTERNAL MEDICINE

## 2023-08-10 PROCEDURE — 1159F PR MEDICATION LIST DOCUMENTED IN MEDICAL RECORD: ICD-10-PCS | Mod: CPTII,S$GLB,, | Performed by: INTERNAL MEDICINE

## 2023-08-10 PROCEDURE — 3077F PR MOST RECENT SYSTOLIC BLOOD PRESSURE >= 140 MM HG: ICD-10-PCS | Mod: CPTII,S$GLB,, | Performed by: INTERNAL MEDICINE

## 2023-08-10 PROCEDURE — 3008F PR BODY MASS INDEX (BMI) DOCUMENTED: ICD-10-PCS | Mod: CPTII,S$GLB,, | Performed by: INTERNAL MEDICINE

## 2023-08-10 PROCEDURE — 3077F SYST BP >= 140 MM HG: CPT | Mod: CPTII,S$GLB,, | Performed by: INTERNAL MEDICINE

## 2023-08-10 PROCEDURE — 3080F PR MOST RECENT DIASTOLIC BLOOD PRESSURE >= 90 MM HG: ICD-10-PCS | Mod: CPTII,S$GLB,, | Performed by: INTERNAL MEDICINE

## 2023-08-10 PROCEDURE — 1160F PR REVIEW ALL MEDS BY PRESCRIBER/CLIN PHARMACIST DOCUMENTED: ICD-10-PCS | Mod: CPTII,S$GLB,, | Performed by: INTERNAL MEDICINE

## 2023-08-10 PROCEDURE — 99999 PR PBB SHADOW E&M-EST. PATIENT-LVL III: CPT | Mod: PBBFAC,,, | Performed by: INTERNAL MEDICINE

## 2023-08-10 PROCEDURE — 3080F DIAST BP >= 90 MM HG: CPT | Mod: CPTII,S$GLB,, | Performed by: INTERNAL MEDICINE

## 2023-08-10 PROCEDURE — 1159F MED LIST DOCD IN RCRD: CPT | Mod: CPTII,S$GLB,, | Performed by: INTERNAL MEDICINE

## 2023-08-10 PROCEDURE — 3008F BODY MASS INDEX DOCD: CPT | Mod: CPTII,S$GLB,, | Performed by: INTERNAL MEDICINE

## 2023-08-10 RX ORDER — HYDROCHLOROTHIAZIDE 12.5 MG/1
12.5 TABLET ORAL DAILY
Qty: 90 TABLET | Refills: 1 | Status: SHIPPED | OUTPATIENT
Start: 2023-08-10 | End: 2023-10-30 | Stop reason: SDUPTHER

## 2023-08-10 RX ORDER — LOSARTAN POTASSIUM 100 MG/1
100 TABLET ORAL DAILY
Qty: 90 TABLET | Refills: 3 | Status: SHIPPED | OUTPATIENT
Start: 2023-08-10 | End: 2023-12-13 | Stop reason: SDUPTHER

## 2023-08-10 RX ORDER — FUROSEMIDE 20 MG/1
20 TABLET ORAL DAILY PRN
Qty: 30 TABLET | Refills: 11
Start: 2023-08-10 | End: 2023-10-30 | Stop reason: SDUPTHER

## 2023-08-10 RX ORDER — MIRTAZAPINE 30 MG/1
30 TABLET, FILM COATED ORAL
COMMUNITY
Start: 2023-06-28 | End: 2023-12-13

## 2023-08-10 RX ORDER — CARVEDILOL 6.25 MG/1
6.25 TABLET ORAL 2 TIMES DAILY WITH MEALS
Qty: 180 TABLET | Refills: 1 | Status: SHIPPED | OUTPATIENT
Start: 2023-08-10 | End: 2023-11-08 | Stop reason: SDUPTHER

## 2023-08-10 NOTE — PROGRESS NOTES
[facility]  Cardiology Clinic Note      Chief Complaint  Chief Complaint   Patient presents with    Follow-up     ED f/u 07/30/2023     Medication Refill     Losartan       HPI:      48-year-old female with past medical history hypertension, echocardiogram 07/30/2023 normal LVEF mild LVH normal diastolic function moderate left atrial dilation mild aortic valve regurgitation mild mitral valve regurgitation normal RV mild-to-moderate tricuspid regurgitation PASP 38 IVC 15    The patient was recently hospitalized with pulmonary edema/elevated BNP echo was not impressive  Possibilities include pulmonary hypertension versus/edema from hypertensive emergency prior to admission as the patient had been off of her antihypertensives    Lasix PRN  Patient states that she was taking hydrochlorothiazide though not on medication list  Patient feels improved allowing for her chronic headaches    Medications  Current Outpatient Medications   Medication Sig Dispense Refill    buPROPion (WELLBUTRIN XL) 150 MG TB24 tablet Take 150 mg by mouth every morning.      clonazePAM (KLONOPIN) 0.5 MG tablet Take 1 tablet (0.5 mg total) by mouth every evening. for 14 days 14 tablet 0    losartan (COZAAR) 100 MG tablet TK 1 T PO QD  5    mirtazapine (REMERON) 30 MG tablet Take 30 mg by mouth.      ondansetron (ZOFRAN) 4 MG tablet Take 1 tablet (4 mg total) by mouth every 6 (six) hours. 12 tablet 0    QULIPTA 60 mg Tab Take 1 tablet by mouth.      carvediloL (COREG) 6.25 MG tablet Take 1 tablet (6.25 mg total) by mouth 2 (two) times daily with meals. 180 tablet 1    furosemide (LASIX) 20 MG tablet Take 1 tablet (20 mg total) by mouth daily as needed (edema). 30 tablet 11    hydroCHLOROthiazide (HYDRODIURIL) 12.5 MG Tab Take 1 tablet (12.5 mg total) by mouth once daily. 90 tablet 1     No current facility-administered medications for this visit.        History  Past Medical History:   Diagnosis Date    Hypertension      Past  Surgical History:   Procedure Laterality Date     SECTION      x2    MINI-LAPAROTOMY      x3 for ruptured ectopic pregnancies    SALPINGECTOMY      SALPINGECTOMY       Social History     Socioeconomic History    Marital status:    Tobacco Use    Smoking status: Never    Smokeless tobacco: Never   Substance and Sexual Activity    Alcohol use: No    Drug use: No    Sexual activity: Yes     Partners: Male     Social Determinants of Health     Financial Resource Strain: Low Risk  (2023)    Overall Financial Resource Strain (CARDIA)     Difficulty of Paying Living Expenses: Not very hard   Food Insecurity: No Food Insecurity (2023)    Hunger Vital Sign     Worried About Running Out of Food in the Last Year: Never true     Ran Out of Food in the Last Year: Never true   Transportation Needs: No Transportation Needs (2023)    PRAPARE - Transportation     Lack of Transportation (Medical): No     Lack of Transportation (Non-Medical): No   Physical Activity: Insufficiently Active (2023)    Exercise Vital Sign     Days of Exercise per Week: 3 days     Minutes of Exercise per Session: 30 min   Stress: No Stress Concern Present (2023)    [employer] - Occupational Stress Questionnaire     Feeling of Stress : Only a little   Social Connections: Moderately Integrated (2023)    Social Connection and Isolation Panel [NHANES]     Frequency of Communication with Friends and Family: Three times a week     Frequency of Social Gatherings with Friends and Family: Three times a week     Attends [employer] Services: Never     Active Member of Clubs or Organizations: No     Attends Club or Organization Meetings: 1 to 4 times per year     Marital Status:    Housing Stability: Low Risk  (2023)    Housing Stability Vital Sign     Unable to Pay for Housing in the Last Year: No     Number of Places Lived in the Last Year: 1     Unstable Housing in the Last Year:  No     Family History   Problem Relation Age of Onset    Heart disease Mother     Hypertension Mother     Diabetes Mother     Heart disease Father     Stroke Father     Diabetes Father     Hypertension Sister     Thyroid disease Sister     Hypertension Sister     Leukemia Brother     Breast cancer Neg Hx     Colon cancer Neg Hx     Ovarian cancer Neg Hx         Allergies  Review of patient's allergies indicates:   Allergen Reactions    Lisinopril Other (See Comments)       Review of Systems   Review of Systems   Constitutional: Negative for fever.   HENT:  Negative for nosebleeds.    Eyes:  Negative for visual disturbance.   Cardiovascular:  Negative for chest pain, claudication, dyspnea on exertion, palpitations and syncope.   Respiratory:  Negative for cough, hemoptysis and wheezing.    Endocrine: Negative for cold intolerance, heat intolerance, polyphagia and polyuria.   Hematologic/Lymphatic: Negative for bleeding problem.   Skin:  Negative for rash.   Musculoskeletal:  Negative for myalgias.   Gastrointestinal:  Negative for hematemesis, hematochezia, nausea and vomiting.   Genitourinary:  Negative for dysuria.   Neurological:  Positive for headaches. Negative for focal weakness and sensory change.   Psychiatric/Behavioral:  Negative for altered mental status.        Physical Exam  Vitals:    08/10/23 0912   BP: (!) 168/100   Pulse: 64     Wt Readings from Last 1 Encounters:   08/10/23 66.7 kg (147 lb 2.5 oz)     Physical Exam  Constitutional:       General: She is not in acute distress.  HENT:      Head: Normocephalic and atraumatic.      Mouth/Throat:      Mouth: Mucous membranes are moist.   Eyes:      Extraocular Movements: Extraocular movements intact.      Pupils: Pupils are equal, round, and reactive to light.   Neck:      Vascular: No carotid bruit or JVD.   Cardiovascular:      Rate and Rhythm: Normal rate and regular rhythm.      Heart sounds: No murmur heard.     No friction rub. No gallop.    Pulmonary:      Effort: Pulmonary effort is normal.      Breath sounds: Normal breath sounds.   Abdominal:      Tenderness: There is no abdominal tenderness. There is no guarding or rebound.   Musculoskeletal:      Right lower leg: No edema.      Left lower leg: No edema.   Skin:     General: Skin is warm and dry.      Capillary Refill: Capillary refill takes less than 2 seconds.   Neurological:      General: No focal deficit present.      Mental Status: She is alert.   Psychiatric:         Mood and Affect: Mood normal.         Labs  Admission on 07/30/2023, Discharged on 08/01/2023   Component Date Value Ref Range Status    WBC 07/30/2023 9.51  3.90 - 12.70 K/uL Final    RBC 07/30/2023 2.68 (L)  4.00 - 5.40 M/uL Final    Hemoglobin 07/30/2023 8.4 (L)  12.0 - 16.0 g/dL Final    Hematocrit 07/30/2023 26.5 (L)  37.0 - 48.5 % Final    MCV 07/30/2023 99 (H)  82 - 98 fL Final    MCH 07/30/2023 31.3 (H)  27.0 - 31.0 pg Final    MCHC 07/30/2023 31.7 (L)  32.0 - 36.0 g/dL Final    RDW 07/30/2023 13.6  11.5 - 14.5 % Final    Platelets 07/30/2023 235  150 - 450 K/uL Final    MPV 07/30/2023 10.8  9.2 - 12.9 fL Final    Immature Granulocytes 07/30/2023 0.5  0.0 - 0.5 % Final    Gran # (ANC) 07/30/2023 7.7  1.8 - 7.7 K/uL Final    Immature Grans (Abs) 07/30/2023 0.05 (H)  0.00 - 0.04 K/uL Final    Comment: Mild elevation in immature granulocytes is non specific and   can be seen in a variety of conditions including stress response,   acute inflammation, trauma and pregnancy. Correlation with other   laboratory and clinical findings is essential.      Lymph # 07/30/2023 1.3  1.0 - 4.8 K/uL Final    Mono # 07/30/2023 0.3  0.3 - 1.0 K/uL Final    Eos # 07/30/2023 0.1  0.0 - 0.5 K/uL Final    Baso # 07/30/2023 0.02  0.00 - 0.20 K/uL Final    nRBC 07/30/2023 0  0 /100 WBC Final    Gran % 07/30/2023 81.3 (H)  38.0 - 73.0 % Final    Lymph % 07/30/2023 13.5 (L)  18.0 - 48.0 % Final    Mono % 07/30/2023 3.4 (L)  4.0 - 15.0 % Final     Eosinophil % 07/30/2023 1.1  0.0 - 8.0 % Final    Basophil % 07/30/2023 0.2  0.0 - 1.9 % Final    Differential Method 07/30/2023 Automated   Final    Sodium 07/30/2023 138  136 - 145 mmol/L Final    Potassium 07/30/2023 4.0  3.5 - 5.1 mmol/L Final    Chloride 07/30/2023 109  95 - 110 mmol/L Final    CO2 07/30/2023 22 (L)  23 - 29 mmol/L Final    Glucose 07/30/2023 120 (H)  70 - 110 mg/dL Final    BUN 07/30/2023 21 (H)  6 - 20 mg/dL Final    Creatinine 07/30/2023 0.8  0.5 - 1.4 mg/dL Final    Calcium 07/30/2023 7.8 (L)  8.7 - 10.5 mg/dL Final    Total Protein 07/30/2023 5.9 (L)  6.0 - 8.4 g/dL Final    Albumin 07/30/2023 3.1 (L)  3.5 - 5.2 g/dL Final    Total Bilirubin 07/30/2023 0.3  0.1 - 1.0 mg/dL Final    Comment: For infants and newborns, interpretation of results should be based  on gestational age, weight and in agreement with clinical  observations.    Premature Infant recommended reference ranges:  Up to 24 hours.............<8.0 mg/dL  Up to 48 hours............<12.0 mg/dL  3-5 days..................<15.0 mg/dL  6-29 days.................<15.0 mg/dL      Alkaline Phosphatase 07/30/2023 102  55 - 135 U/L Final    AST 07/30/2023 68 (H)  10 - 40 U/L Final    ALT 07/30/2023 127 (H)  10 - 44 U/L Final    eGFR 07/30/2023 >60.0  >60 mL/min/1.73 m^2 Final    Anion Gap 07/30/2023 7 (L)  8 - 16 mmol/L Final    D-Dimer 07/30/2023 0.24  <0.50 mg/L FEU Final    Comment: The quantitative D-dimer assay should be used as an aid in   the diagnosis of deep vein thrombosis and pulmonary embolism  in patients with the appropriate presentation and clinical  history. The upper limit of the reference interval and the clinical   cut off   point are identical. Causes of a positive (>0.50 mg/L FEU) D-Dimer   test  include, but are not limited to: DVT, PE, DIC, thrombolytic   therapy, anticoagulant therapy, recent surgery, trauma, or   pregnancy, disseminated malignancy, aortic aneurysm, cirrhosis,  and severe infection. False  negative results may occur in   patients with distal DVT.  ZION^612^^7^  LOT^610^DDiSup^[id number]\DDiBuf^[id number]\DDiReag^[id number]      Magnesium 07/30/2023 1.8  1.6 - 2.6 mg/dL Final    BNP 07/30/2023 220 (H)  0 - 99 pg/mL Final    Values of less than 100 pg/ml are consistent with non-CHF populations.    Troponin I 07/30/2023 <0.006  0.000 - 0.026 ng/mL Final    Comment: The reference interval for Troponin I represents the 99th percentile   cutoff   for our facility and is consistent with 3rd generation assay   performance.      TSH 07/30/2023 7.187 (H)  0.400 - 4.000 uIU/mL Final    Free T4 07/30/2023 0.75  0.71 - 1.51 ng/dL Final    BSA 07/31/2023 1.74  m2 Final    LVOT stroke volume 07/31/2023 39.63  cm3 Final    LVIDd 07/31/2023 3.80  3.5 - 6.0 cm Final    LV Systolic Volume 07/31/2023 27.71  mL Final    LV Systolic Volume Index 07/31/2023 16.1  mL/m2 Final    LVIDs 07/31/2023 2.73  2.1 - 4.0 cm Final    LV Diastolic Volume 07/31/2023 61.81  mL Final    LV Diastolic Volume Index 07/31/2023 35.94  mL/m2 Final    IVS 07/31/2023 1.30 (A)  0.6 - 1.1 cm Final    LVOT diameter 07/31/2023 1.63  cm Final    LVOT area 07/31/2023 2.1  cm2 Final    FS 07/31/2023 28  28 - 44 % Final    Left Ventricle Relative Wall Thick* 07/31/2023 0.56  cm Final    Posterior Wall 07/31/2023 1.06  0.6 - 1.1 cm Final    TDI LATERAL 07/31/2023 0.11  m/s Final    TDI SEPTAL 07/31/2023 0.09  m/s Final    LV mass 07/31/2023 149.42  g Final    LV Mass Index 07/31/2023 87  g/m2 Final    MV Peak E Howard 07/31/2023 0.72  m/s Final    LV LATERAL E/E' RATIO 07/31/2023 6.55  m/s Final    LV SEPTAL E/E' RATIO 07/31/2023 8.00  m/s Final    E/E' ratio 07/31/2023 7.20  m/s Final    MV Peak A Howard 07/31/2023 0.60  m/s Final    TR Max Howard 07/31/2023 2.4  m/s Final    E/A ratio 07/31/2023 1.20   Final    Mean e' 07/31/2023 0.10  m/s Final    E wave deceleration time 07/31/2023 235.77  msec Final    PV Peak S Howard 07/31/2023 0.90  m/s Final    PV Peak D Howard 07/31/2023  0.64  m/s Final    Pulm vein S/D ratio 07/31/2023 1.41   Final    LVOT peak jenn 07/31/2023 0.88  m/s Final    Left Ventricular Outflow Tract Hallie* 07/31/2023 0.59  cm/s Final    Left Ventricular Outflow Tract Hallie* 07/31/2023 1.63  mmHg Final    LA volume (mod) 07/31/2023 75.62  cm3 Final    LA Volume Index (Mod) 07/31/2023 44.0  mL/m2 Final    Left Atrium Major Axis 07/31/2023 4.26  cm Final    Left Atrium Minor Axis 07/31/2023 3.00  cm Final    RVDD 07/31/2023 2.31  cm Final    RA Major Axis 07/31/2023 3.51  cm Final    RA Width 07/31/2023 3.05  cm Final    AV regurgitation pressure 1/2 time 07/31/2023 513.411225288242581  ms Final    AR Max Jenn 07/31/2023 4.76  m/s Final    AV peak gradient 07/31/2023 10  mmHg Final    Ao peak jenn 07/31/2023 1.62  m/s Final    LVOT peak VTI 07/31/2023 19.00  cm Final    AV Velocity Ratio 07/31/2023 0.54   Final    SONU by Velocity Ratio 07/31/2023 1.13  cm² Final    Mr max jenn 07/31/2023 2.73  m/s Final    MV stenosis pressure 1/2 time 07/31/2023 68.37  ms Final    MV valve area p 1/2 method 07/31/2023 3.22  cm2 Final    Triscuspid Valve Regurgitation Pea* 07/31/2023 23  mmHg Final    PV PEAK VELOCITY 07/31/2023 0.90  m/s Final    PV peak gradient 07/31/2023 3  mmHg Final    Ao root annulus 07/31/2023 2.85  cm Final    ZLVIDS 07/31/2023 -0.62   Final    ZLVIDD 07/31/2023 -2.27   Final    AORTIC VALVE CUSP SEPERATION 07/31/2023 1.46  cm Final    IVC diameter 07/31/2023 2.07  cm Final    RV TB RVSP 07/31/2023 17  mmHg Final    Est. RA pres 07/31/2023 15  mmHg Final    Troponin I 07/30/2023 <0.006  0.000 - 0.026 ng/mL Final    Comment: The reference interval for Troponin I represents the 99th percentile   cutoff   for our facility and is consistent with 3rd generation assay   performance.      WBC 07/31/2023 14.51 (H)  3.90 - 12.70 K/uL Final    RBC 07/31/2023 3.07 (L)  4.00 - 5.40 M/uL Final    Hemoglobin 07/31/2023 9.7 (L)  12.0 - 16.0 g/dL Final    Hematocrit 07/31/2023 30.1 (L)   37.0 - 48.5 % Final    MCV 07/31/2023 98  82 - 98 fL Final    MCH 07/31/2023 31.6 (H)  27.0 - 31.0 pg Final    MCHC 07/31/2023 32.2  32.0 - 36.0 g/dL Final    RDW 07/31/2023 14.1  11.5 - 14.5 % Final    Platelets 07/31/2023 292  150 - 450 K/uL Final    MPV 07/31/2023 11.2  9.2 - 12.9 fL Final    Immature Granulocytes 07/31/2023 0.4  0.0 - 0.5 % Final    Gran # (ANC) 07/31/2023 11.3 (H)  1.8 - 7.7 K/uL Final    Immature Grans (Abs) 07/31/2023 0.06 (H)  0.00 - 0.04 K/uL Final    Comment: Mild elevation in immature granulocytes is non specific and   can be seen in a variety of conditions including stress response,   acute inflammation, trauma and pregnancy. Correlation with other   laboratory and clinical findings is essential.      Lymph # 07/31/2023 2.7  1.0 - 4.8 K/uL Final    Mono # 07/31/2023 0.5  0.3 - 1.0 K/uL Final    Eos # 07/31/2023 0.0  0.0 - 0.5 K/uL Final    Baso # 07/31/2023 0.01  0.00 - 0.20 K/uL Final    nRBC 07/31/2023 0  0 /100 WBC Final    Gran % 07/31/2023 77.6 (H)  38.0 - 73.0 % Final    Lymph % 07/31/2023 18.3  18.0 - 48.0 % Final    Mono % 07/31/2023 3.4 (L)  4.0 - 15.0 % Final    Eosinophil % 07/31/2023 0.2  0.0 - 8.0 % Final    Basophil % 07/31/2023 0.1  0.0 - 1.9 % Final    Differential Method 07/31/2023 Automated   Final    Sodium 07/31/2023 142  136 - 145 mmol/L Final    Potassium 07/31/2023 4.0  3.5 - 5.1 mmol/L Final    Chloride 07/31/2023 107  95 - 110 mmol/L Final    CO2 07/31/2023 22 (L)  23 - 29 mmol/L Final    Glucose 07/31/2023 131 (H)  70 - 110 mg/dL Final    BUN 07/31/2023 32 (H)  6 - 20 mg/dL Final    Creatinine 07/31/2023 0.9  0.5 - 1.4 mg/dL Final    Calcium 07/31/2023 9.2  8.7 - 10.5 mg/dL Final    Anion Gap 07/31/2023 13  8 - 16 mmol/L Final    eGFR 07/31/2023 >60.0  >60 mL/min/1.73 m^2 Final    WBC 08/01/2023 11.94  3.90 - 12.70 K/uL Final    RBC 08/01/2023 3.91 (L)  4.00 - 5.40 M/uL Final    Hemoglobin 08/01/2023 12.3  12.0 - 16.0 g/dL Final    Hematocrit 08/01/2023 37.9   37.0 - 48.5 % Final    MCV 08/01/2023 97  82 - 98 fL Final    MCH 08/01/2023 31.5 (H)  27.0 - 31.0 pg Final    MCHC 08/01/2023 32.5  32.0 - 36.0 g/dL Final    RDW 08/01/2023 14.2  11.5 - 14.5 % Final    Platelets 08/01/2023 377  150 - 450 K/uL Final    MPV 08/01/2023 10.7  9.2 - 12.9 fL Final    Immature Granulocytes 08/01/2023 0.8 (H)  0.0 - 0.5 % Final    Gran # (ANC) 08/01/2023 8.7 (H)  1.8 - 7.7 K/uL Final    Immature Grans (Abs) 08/01/2023 0.10 (H)  0.00 - 0.04 K/uL Final    Comment: Mild elevation in immature granulocytes is non specific and   can be seen in a variety of conditions including stress response,   acute inflammation, trauma and pregnancy. Correlation with other   laboratory and clinical findings is essential.      Lymph # 08/01/2023 2.4  1.0 - 4.8 K/uL Final    Mono # 08/01/2023 0.6  0.3 - 1.0 K/uL Final    Eos # 08/01/2023 0.1  0.0 - 0.5 K/uL Final    Baso # 08/01/2023 0.04  0.00 - 0.20 K/uL Final    nRBC 08/01/2023 0  0 /100 WBC Final    Gran % 08/01/2023 73.2 (H)  38.0 - 73.0 % Final    Lymph % 08/01/2023 19.8  18.0 - 48.0 % Final    Mono % 08/01/2023 4.9  4.0 - 15.0 % Final    Eosinophil % 08/01/2023 1.0  0.0 - 8.0 % Final    Basophil % 08/01/2023 0.3  0.0 - 1.9 % Final    Differential Method 08/01/2023 Automated   Final    Sodium 08/01/2023 141  136 - 145 mmol/L Final    Potassium 08/01/2023 4.7  3.5 - 5.1 mmol/L Final    Chloride 08/01/2023 102  95 - 110 mmol/L Final    CO2 08/01/2023 28  23 - 29 mmol/L Final    Glucose 08/01/2023 102  70 - 110 mg/dL Final    BUN 08/01/2023 23 (H)  6 - 20 mg/dL Final    Creatinine 08/01/2023 0.9  0.5 - 1.4 mg/dL Final    Calcium 08/01/2023 9.2  8.7 - 10.5 mg/dL Final    Anion Gap 08/01/2023 11  8 - 16 mmol/L Final    eGFR 08/01/2023 >60.0  >60 mL/min/1.73 m^2 Final       EKG  EKG normal sinus rhythm normal rate nonspecific ST abnormality predominantly lead V2 biphasic T-wave early repolarization borderline QT interval    Echo   Results for orders placed or  performed during the hospital encounter of 07/30/23   Echo Saline Bubble? No   Result Value Ref Range    BSA 1.74 m2    LVOT stroke volume 39.63 cm3    LVIDd 3.80 3.5 - 6.0 cm    LV Systolic Volume 27.71 mL    LV Systolic Volume Index 16.1 mL/m2    LVIDs 2.73 2.1 - 4.0 cm    LV Diastolic Volume 61.81 mL    LV Diastolic Volume Index 35.94 mL/m2    IVS 1.30 (A) 0.6 - 1.1 cm    LVOT diameter 1.63 cm    LVOT area 2.1 cm2    FS 28 28 - 44 %    Left Ventricle Relative Wall Thickness 0.56 cm    Posterior Wall 1.06 0.6 - 1.1 cm    TDI LATERAL 0.11 m/s    TDI SEPTAL 0.09 m/s    LV mass 149.42 g    LV Mass Index 87 g/m2    MV Peak E Howard 0.72 m/s    LV LATERAL E/E' RATIO 6.55 m/s    LV SEPTAL E/E' RATIO 8.00 m/s    E/E' ratio 7.20 m/s    MV Peak A Howard 0.60 m/s    TR Max Howard 2.4 m/s    E/A ratio 1.20     Mean e' 0.10 m/s    E wave deceleration time 235.77 msec    PV Peak S Howard 0.90 m/s    PV Peak D Howard 0.64 m/s    Pulm vein S/D ratio 1.41     LVOT peak howard 0.88 m/s    Left Ventricular Outflow Tract Mean Velocity 0.59 cm/s    Left Ventricular Outflow Tract Mean Gradient 1.63 mmHg    LA volume (mod) 75.62 cm3    LA Volume Index (Mod) 44.0 mL/m2    Left Atrium Major Axis 4.26 cm    Left Atrium Minor Axis 3.00 cm    RVDD 2.31 cm    RA Major Axis 3.51 cm    RA Width 3.05 cm    AV regurgitation pressure 1/2 time [id number] ms    AR Max Howard 4.76 m/s    AV peak gradient 10 mmHg    Ao peak howard 1.62 m/s    LVOT peak VTI 19.00 cm    AV Velocity Ratio 0.54     SONU by Velocity Ratio 1.13 cm²    Mr max howard 2.73 m/s    MV stenosis pressure 1/2 time 68.37 ms    MV valve area p 1/2 method 3.22 cm2    Triscuspid Valve Regurgitation Peak Gradient 23 mmHg    PV PEAK VELOCITY 0.90 m/s    PV peak gradient 3 mmHg    Ao root annulus 2.85 cm    ZLVIDS -0.62     ZLVIDD -2.27     AORTIC VALVE CUSP SEPERATION 1.46 cm    IVC diameter 2.07 cm    RV TB RVSP 17 mmHg    Est. RA pres 15 mmHg    Narrative      Left Ventricle: The left ventricle is  normal in size. Mildly increased   wall thickness. Normal wall motion. There is normal systolic function with   a visually estimated ejection fraction of 55 - 60%. There is normal   diastolic function.    Left Atrium: Left atrium is moderately dilated.    Aortic Valve: There is mild aortic regurgitation.    Mitral Valve: There is no stenosis. There is mild regurgitation.    Right Ventricle: Normal right ventricular cavity size. Systolic   function is normal.    Tricuspid Valve: There is mild to moderate transvalvular regurgitation.    PASP is 38 mmHg.    IVC/SVC: Elevated venous pressure at 15 mmHg.         Imaging  X-Ray Chest 1 View    Result Date: 7/31/2023  EXAMINATION: XR CHEST 1 VIEW CLINICAL HISTORY: heart failure; TECHNIQUE: Single frontal view of the chest was performed. COMPARISON: 07/30/2023 FINDINGS: Monitoring EKG leads are present.  The trachea is unremarkable.  The cardiomediastinal silhouette is within normal limits.  The hemidiaphragms are unremarkable.  There is no evidence of free air beneath the hemidiaphragms.  There are no pleural effusions.  There is no evidence of a pneumothorax.  There is no evidence of pneumomediastinum.  No airspace opacity is present.  The osseous structures are unremarkable.     Unremarkable examination.  No evidence of failure. Electronically signed by: Spencer Bains MD Date:    07/31/2023 Time:    15:33    Echo Saline Bubble? No    Result Date: 7/31/2023    Left Ventricle: The left ventricle is normal in size. Mildly increased wall thickness. Normal wall motion. There is normal systolic function with a visually estimated ejection fraction of 55 - 60%. There is normal diastolic function.   Left Atrium: Left atrium is moderately dilated.   Aortic Valve: There is mild aortic regurgitation.   Mitral Valve: There is no stenosis. There is mild regurgitation.   Right Ventricle: Normal right ventricular cavity size. Systolic function is normal.   Tricuspid Valve: There is mild  to moderate transvalvular regurgitation.   PASP is 38 mmHg.   IVC/SVC: Elevated venous pressure at 15 mmHg.     X-Ray Chest PA And Lateral    Result Date: 7/30/2023  EXAMINATION: XR CHEST PA AND LATERAL CLINICAL HISTORY: Shortness of breath TECHNIQUE: PA and lateral views of the chest were performed. COMPARISON: 09/19/2011 FINDINGS: Soft tissues of the patient's arms project over lateral view obscuring detail of the retrosternal airspace and mediastinal margins.  The cardiomediastinal silhouette is within normal limits of size and configuration.  The lungs are symmetrically expanded with diffuse increased interstitial and parenchymal attenuation which can be seen with interstitial edema although correlation for infectious process advised.  No confluent airspace consolidation or pneumothorax identified.  Possible trace pleural effusions.  Osseous structures are intact.     Please see above. Electronically signed by: Tequila Harrell MD Date:    07/30/2023 Time:    03:09      Prior coronary angiogram / intervention:  N/A    Assessment and Plan  1. Heart failure with preserved ejection fraction, unspecified HF chronicity  Euvolemic Lasix p.r.n. consider repeating echo in the near future    2. Hypertension, unspecified type  Stop metoprolol start carvedilol 6.25 b.i.d. decrease hydrochlorothiazide to 12.5 continue losartan 100  Blood pressure log call me with concerns  - carvediloL (COREG) 6.25 MG tablet; Take 1 tablet (6.25 mg total) by mouth 2 (two) times daily with meals.  Dispense: 180 tablet; Refill: 1  - hydroCHLOROthiazide (HYDRODIURIL) 12.5 MG Tab; Take 1 tablet (12.5 mg total) by mouth once daily.  Dispense: 90 tablet; Refill: 1    3. Hypertensive urgency  As above    Total professional time spent for the encounter: 30 minutes  Time was spent preparing to see the patient, reviewing results of prior testing, obtaining and/or reviewing separately obtained history, performing a medically appropriate examination and  interview, counseling and educating the patient/family, ordering medications/tests/procedures, referring and communicating with other health care professionals, documenting clinical information in the electronic health record, and independently interpreting results.     Follow Up  Follow up in about 2 weeks (around 8/24/2023).      Jg Shaffer MD, FACC, VI  Interventional Cardiology

## 2023-08-24 ENCOUNTER — OFFICE VISIT (OUTPATIENT)
Dept: CARDIOLOGY | Facility: CLINIC | Age: 48
End: 2023-08-24
Payer: COMMERCIAL

## 2023-08-24 VITALS
OXYGEN SATURATION: 100 % | HEIGHT: 64 IN | BODY MASS INDEX: 25.01 KG/M2 | HEART RATE: 80 BPM | DIASTOLIC BLOOD PRESSURE: 83 MMHG | SYSTOLIC BLOOD PRESSURE: 133 MMHG | WEIGHT: 146.5 LBS

## 2023-08-24 DIAGNOSIS — I50.30 HEART FAILURE WITH PRESERVED EJECTION FRACTION, UNSPECIFIED HF CHRONICITY: Primary | ICD-10-CM

## 2023-08-24 DIAGNOSIS — I10 HYPERTENSION, UNSPECIFIED TYPE: ICD-10-CM

## 2023-08-24 PROCEDURE — 3075F PR MOST RECENT SYSTOLIC BLOOD PRESS GE 130-139MM HG: ICD-10-PCS | Mod: CPTII,S$GLB,, | Performed by: INTERNAL MEDICINE

## 2023-08-24 PROCEDURE — 3079F DIAST BP 80-89 MM HG: CPT | Mod: CPTII,S$GLB,, | Performed by: INTERNAL MEDICINE

## 2023-08-24 PROCEDURE — 4010F PR ACE/ARB THEARPY RXD/TAKEN: ICD-10-PCS | Mod: CPTII,S$GLB,, | Performed by: INTERNAL MEDICINE

## 2023-08-24 PROCEDURE — 1160F RVW MEDS BY RX/DR IN RCRD: CPT | Mod: CPTII,S$GLB,, | Performed by: INTERNAL MEDICINE

## 2023-08-24 PROCEDURE — 1159F MED LIST DOCD IN RCRD: CPT | Mod: CPTII,S$GLB,, | Performed by: INTERNAL MEDICINE

## 2023-08-24 PROCEDURE — 3008F BODY MASS INDEX DOCD: CPT | Mod: CPTII,S$GLB,, | Performed by: INTERNAL MEDICINE

## 2023-08-24 PROCEDURE — 3008F PR BODY MASS INDEX (BMI) DOCUMENTED: ICD-10-PCS | Mod: CPTII,S$GLB,, | Performed by: INTERNAL MEDICINE

## 2023-08-24 PROCEDURE — 99214 PR OFFICE/OUTPT VISIT, EST, LEVL IV, 30-39 MIN: ICD-10-PCS | Mod: S$GLB,,, | Performed by: INTERNAL MEDICINE

## 2023-08-24 PROCEDURE — 99999 PR PBB SHADOW E&M-EST. PATIENT-LVL IV: ICD-10-PCS | Mod: PBBFAC,,, | Performed by: INTERNAL MEDICINE

## 2023-08-24 PROCEDURE — 4010F ACE/ARB THERAPY RXD/TAKEN: CPT | Mod: CPTII,S$GLB,, | Performed by: INTERNAL MEDICINE

## 2023-08-24 PROCEDURE — 99999 PR PBB SHADOW E&M-EST. PATIENT-LVL IV: CPT | Mod: PBBFAC,,, | Performed by: INTERNAL MEDICINE

## 2023-08-24 PROCEDURE — 1159F PR MEDICATION LIST DOCUMENTED IN MEDICAL RECORD: ICD-10-PCS | Mod: CPTII,S$GLB,, | Performed by: INTERNAL MEDICINE

## 2023-08-24 PROCEDURE — 3079F PR MOST RECENT DIASTOLIC BLOOD PRESSURE 80-89 MM HG: ICD-10-PCS | Mod: CPTII,S$GLB,, | Performed by: INTERNAL MEDICINE

## 2023-08-24 PROCEDURE — 3075F SYST BP GE 130 - 139MM HG: CPT | Mod: CPTII,S$GLB,, | Performed by: INTERNAL MEDICINE

## 2023-08-24 PROCEDURE — 1160F PR REVIEW ALL MEDS BY PRESCRIBER/CLIN PHARMACIST DOCUMENTED: ICD-10-PCS | Mod: CPTII,S$GLB,, | Performed by: INTERNAL MEDICINE

## 2023-08-24 PROCEDURE — 99214 OFFICE O/P EST MOD 30 MIN: CPT | Mod: S$GLB,,, | Performed by: INTERNAL MEDICINE

## 2023-08-24 RX ORDER — GABAPENTIN 100 MG/1
100 CAPSULE ORAL
COMMUNITY
Start: 2023-08-14 | End: 2023-12-13 | Stop reason: SDUPTHER

## 2023-08-24 NOTE — PROGRESS NOTES
[facility] - Cardiology [address]  Cardiology Clinic Note      Chief Complaint  Chief Complaint   Patient presents with    Follow-up     2 wk       HPI:      48-year-old female with past medical history hypertension, echocardiogram 07/30/2023 normal LVEF mild LVH normal diastolic function moderate left atrial dilation mild aortic valve regurgitation mild mitral valve regurgitation normal RV mild-to-moderate tricuspid regurgitation PASP 38 IVC 15    The patient was recently hospitalized with pulmonary edema/elevated BNP echo was not impressive  Possibilities include pulmonary hypertension versus/edema from hypertensive emergency prior to admission as the patient had been off of her antihypertensives    Lasix PRN  Last visit discontinue metoprolol and start carvedilol in addition to decreasing hydrochlorothiazide to 12.5 and continuing losartan  The patient has not required Lasix in his currently asymptomatic    Medications  Current Outpatient Medications   Medication Sig Dispense Refill    buPROPion (WELLBUTRIN XL) 150 MG TB24 tablet Take 150 mg by mouth every morning.      carvediloL (COREG) 6.25 MG tablet Take 1 tablet (6.25 mg total) by mouth 2 (two) times daily with meals. 180 tablet 1    clonazePAM (KLONOPIN) 0.5 MG tablet Take 1 tablet (0.5 mg total) by mouth every evening. for 14 days 14 tablet 0    furosemide (LASIX) 20 MG tablet Take 1 tablet (20 mg total) by mouth daily as needed (edema). 30 tablet 11    gabapentin (NEURONTIN) 100 MG capsule Take 100 mg by mouth.      hydroCHLOROthiazide (HYDRODIURIL) 12.5 MG Tab Take 1 tablet (12.5 mg total) by mouth once daily. 90 tablet 1    losartan (COZAAR) 100 MG tablet Take 1 tablet (100 mg total) by mouth once daily. 90 tablet 3    mirtazapine (REMERON) 30 MG tablet Take 30 mg by mouth.      ondansetron (ZOFRAN) 4 MG tablet Take 1 tablet (4 mg total) by mouth every 6 (six) hours. 12 tablet 0    QULIPTA 60 mg Tab Take 1 tablet by mouth.       No current  facility-administered medications for this visit.        History  Past Medical History:   Diagnosis Date    Hypertension      Past Surgical History:   Procedure Laterality Date     SECTION      x2    MINI-LAPAROTOMY      x3 for ruptured ectopic pregnancies    SALPINGECTOMY      SALPINGECTOMY       Social History     Socioeconomic History    Marital status:    Tobacco Use    Smoking status: Never    Smokeless tobacco: Never   Substance and Sexual Activity    Alcohol use: No    Drug use: No    Sexual activity: Yes     Partners: Male     Social Determinants of Health     Financial Resource Strain: Low Risk  (2023)    Overall Financial Resource Strain (CARDIA)     Difficulty of Paying Living Expenses: Not very hard   Food Insecurity: No Food Insecurity (2023)    Hunger Vital Sign     Worried About Running Out of Food in the Last Year: Never true     Ran Out of Food in the Last Year: Never true   Transportation Needs: No Transportation Needs (2023)    PRAPARE - Transportation     Lack of Transportation (Medical): No     Lack of Transportation (Non-Medical): No   Physical Activity: Insufficiently Active (2023)    Exercise Vital Sign     Days of Exercise per Week: 3 days     Minutes of Exercise per Session: 30 min   Stress: No Stress Concern Present (2023)    [employer] [place] [employer] - Occupational Stress Questionnaire     Feeling of Stress : Only a little   Social Connections: Moderately Integrated (2023)    Social Connection and Isolation Panel [NHANES]     Frequency of Communication with Friends and Family: Three times a week     Frequency of Social Gatherings with Friends and Family: Three times a week     Attends Orthodox Services: Never     Active Member of Clubs or Organizations: No     Attends Club or Organization Meetings: 1 to 4 times per year     Marital Status:    Housing Stability: Low Risk  (2023)    Housing Stability Vital Sign      Unable to Pay for Housing in the Last Year: No     Number of Places Lived in the Last Year: 1     Unstable Housing in the Last Year: No     Family History   Problem Relation Age of Onset    Heart disease Mother     Hypertension Mother     Diabetes Mother     Heart disease Father     Stroke Father     Diabetes Father     Hypertension Sister     Thyroid disease Sister     Hypertension Sister     Leukemia Brother     Breast cancer Neg Hx     Colon cancer Neg Hx     Ovarian cancer Neg Hx         Allergies  Review of patient's allergies indicates:   Allergen Reactions    Lisinopril Other (See Comments)       Review of Systems   Review of Systems   Constitutional: Negative for fever.   HENT:  Negative for nosebleeds.    Eyes:  Negative for visual disturbance.   Cardiovascular:  Negative for chest pain, claudication, dyspnea on exertion, palpitations and syncope.   Respiratory:  Negative for cough, hemoptysis and wheezing.    Endocrine: Negative for cold intolerance, heat intolerance, polyphagia and polyuria.   Hematologic/Lymphatic: Negative for bleeding problem.   Skin:  Negative for rash.   Musculoskeletal:  Negative for myalgias.   Gastrointestinal:  Negative for hematemesis, hematochezia, nausea and vomiting.   Genitourinary:  Negative for dysuria.   Neurological:  Positive for headaches. Negative for focal weakness and sensory change.   Psychiatric/Behavioral:  Negative for altered mental status.        Physical Exam  Vitals:    08/24/23 1026   BP: 133/83   Pulse: 80     Wt Readings from Last 1 Encounters:   08/24/23 66.4 kg (146 lb 7.9 oz)     Physical Exam  Constitutional:       General: She is not in acute distress.  HENT:      Head: Normocephalic and atraumatic.      Mouth/Throat:      Mouth: Mucous membranes are moist.   Eyes:      Extraocular Movements: Extraocular movements intact.      Pupils: Pupils are equal, round, and reactive to light.   Neck:      Vascular: No carotid bruit or JVD.   Cardiovascular:       Rate and Rhythm: Normal rate and regular rhythm.      Heart sounds: No murmur heard.     No friction rub. No gallop.   Pulmonary:      Effort: Pulmonary effort is normal.      Breath sounds: Normal breath sounds.   Abdominal:      Tenderness: There is no abdominal tenderness. There is no guarding or rebound.   Musculoskeletal:      Right lower leg: No edema.      Left lower leg: No edema.   Skin:     General: Skin is warm and dry.      Capillary Refill: Capillary refill takes less than 2 seconds.   Neurological:      General: No focal deficit present.      Mental Status: She is alert.   Psychiatric:         Mood and Affect: Mood normal.         Labs  Admission on 07/30/2023, Discharged on 08/01/2023   Component Date Value Ref Range Status    WBC 07/30/2023 9.51  3.90 - 12.70 K/uL Final    RBC 07/30/2023 2.68 (L)  4.00 - 5.40 M/uL Final    Hemoglobin 07/30/2023 8.4 (L)  12.0 - 16.0 g/dL Final    Hematocrit 07/30/2023 26.5 (L)  37.0 - 48.5 % Final    MCV 07/30/2023 99 (H)  82 - 98 fL Final    MCH 07/30/2023 31.3 (H)  27.0 - 31.0 pg Final    MCHC 07/30/2023 31.7 (L)  32.0 - 36.0 g/dL Final    RDW 07/30/2023 13.6  11.5 - 14.5 % Final    Platelets 07/30/2023 235  150 - 450 K/uL Final    MPV 07/30/2023 10.8  9.2 - 12.9 fL Final    Immature Granulocytes 07/30/2023 0.5  0.0 - 0.5 % Final    Gran # (ANC) 07/30/2023 7.7  1.8 - 7.7 K/uL Final    Immature Grans (Abs) 07/30/2023 0.05 (H)  0.00 - 0.04 K/uL Final    Comment: Mild elevation in immature granulocytes is non specific and   can be seen in a variety of conditions including stress response,   acute inflammation, trauma and pregnancy. Correlation with other   laboratory and clinical findings is essential.      Lymph # 07/30/2023 1.3  1.0 - 4.8 K/uL Final    Mono # 07/30/2023 0.3  0.3 - 1.0 K/uL Final    Eos # 07/30/2023 0.1  0.0 - 0.5 K/uL Final    Baso # 07/30/2023 0.02  0.00 - 0.20 K/uL Final    nRBC 07/30/2023 0  0 /100 WBC Final    Gran % 07/30/2023 81.3 (H)  38.0 -  73.0 % Final    Lymph % 07/30/2023 13.5 (L)  18.0 - 48.0 % Final    Mono % 07/30/2023 3.4 (L)  4.0 - 15.0 % Final    Eosinophil % 07/30/2023 1.1  0.0 - 8.0 % Final    Basophil % 07/30/2023 0.2  0.0 - 1.9 % Final    Differential Method 07/30/2023 Automated   Final    Sodium 07/30/2023 138  136 - 145 mmol/L Final    Potassium 07/30/2023 4.0  3.5 - 5.1 mmol/L Final    Chloride 07/30/2023 109  95 - 110 mmol/L Final    CO2 07/30/2023 22 (L)  23 - 29 mmol/L Final    Glucose 07/30/2023 120 (H)  70 - 110 mg/dL Final    BUN 07/30/2023 21 (H)  6 - 20 mg/dL Final    Creatinine 07/30/2023 0.8  0.5 - 1.4 mg/dL Final    Calcium 07/30/2023 7.8 (L)  8.7 - 10.5 mg/dL Final    Total Protein 07/30/2023 5.9 (L)  6.0 - 8.4 g/dL Final    Albumin 07/30/2023 3.1 (L)  3.5 - 5.2 g/dL Final    Total Bilirubin 07/30/2023 0.3  0.1 - 1.0 mg/dL Final    Comment: For infants and newborns, interpretation of results should be based  on gestational age, weight and in agreement with clinical  observations.    Premature Infant recommended reference ranges:  Up to 24 hours.............<8.0 mg/dL  Up to 48 hours............<12.0 mg/dL  3-5 days..................<15.0 mg/dL  6-29 days.................<15.0 mg/dL      Alkaline Phosphatase 07/30/2023 102  55 - 135 U/L Final    AST 07/30/2023 68 (H)  10 - 40 U/L Final    ALT 07/30/2023 127 (H)  10 - 44 U/L Final    eGFR 07/30/2023 >60.0  >60 mL/min/1.73 m^2 Final    Anion Gap 07/30/2023 7 (L)  8 - 16 mmol/L Final    D-Dimer 07/30/2023 0.24  <0.50 mg/L FEU Final    Comment: The quantitative D-dimer assay should be used as an aid in   the diagnosis of deep vein thrombosis and pulmonary embolism  in patients with the appropriate presentation and clinical  history. The upper limit of the reference interval and the clinical   cut off   point are identical. Causes of a positive (>0.50 mg/L FEU) D-Dimer   test  include, but are not limited to: DVT, PE, DIC, thrombolytic   therapy, anticoagulant therapy, recent  surgery, trauma, or   pregnancy, disseminated malignancy, aortic aneurysm, cirrhosis,  and severe infection. False negative results may occur in   patients with distal DVT.  ZION^612^^7^  LOT^610^DDiSup^[id number]\DDiBuf^[id number]\DDiReag^[id number]      Magnesium 07/30/2023 1.8  1.6 - 2.6 mg/dL Final    BNP 07/30/2023 220 (H)  0 - 99 pg/mL Final    Values of less than 100 pg/ml are consistent with non-CHF populations.    Troponin I 07/30/2023 <0.006  0.000 - 0.026 ng/mL Final    Comment: The reference interval for Troponin I represents the 99th percentile   cutoff   for our facility and is consistent with 3rd generation assay   performance.      TSH 07/30/2023 7.187 (H)  0.400 - 4.000 uIU/mL Final    Free T4 07/30/2023 0.75  0.71 - 1.51 ng/dL Final    BSA 07/31/2023 1.74  m2 Final    LVOT stroke volume 07/31/2023 39.63  cm3 Final    LVIDd 07/31/2023 3.80  3.5 - 6.0 cm Final    LV Systolic Volume 07/31/2023 27.71  mL Final    LV Systolic Volume Index 07/31/2023 16.1  mL/m2 Final    LVIDs 07/31/2023 2.73  2.1 - 4.0 cm Final    LV Diastolic Volume 07/31/2023 61.81  mL Final    LV Diastolic Volume Index 07/31/2023 35.94  mL/m2 Final    IVS 07/31/2023 1.30 (A)  0.6 - 1.1 cm Final    LVOT diameter 07/31/2023 1.63  cm Final    LVOT area 07/31/2023 2.1  cm2 Final    FS 07/31/2023 28  28 - 44 % Final    Left Ventricle Relative Wall Thick* 07/31/2023 0.56  cm Final    Posterior Wall 07/31/2023 1.06  0.6 - 1.1 cm Final    TDI LATERAL 07/31/2023 0.11  m/s Final    TDI SEPTAL 07/31/2023 0.09  m/s Final    LV mass 07/31/2023 149.42  g Final    LV Mass Index 07/31/2023 87  g/m2 Final    MV Peak E Howard 07/31/2023 0.72  m/s Final    LV LATERAL E/E' RATIO 07/31/2023 6.55  m/s Final    LV SEPTAL E/E' RATIO 07/31/2023 8.00  m/s Final    E/E' ratio 07/31/2023 7.20  m/s Final    MV Peak A Howard 07/31/2023 0.60  m/s Final    TR Max Howard 07/31/2023 2.4  m/s Final    E/A ratio 07/31/2023 1.20   Final    Mean e' 07/31/2023 0.10  m/s Final    E wave  deceleration time 07/31/2023 235.77  msec Final    PV Peak S Howard 07/31/2023 0.90  m/s Final    PV Peak D Howard 07/31/2023 0.64  m/s Final    Pulm vein S/D ratio 07/31/2023 1.41   Final    LVOT peak howard 07/31/2023 0.88  m/s Final    Left Ventricular Outflow Tract Hallie* 07/31/2023 0.59  cm/s Final    Left Ventricular Outflow Tract Hallie* 07/31/2023 1.63  mmHg Final    LA volume (mod) 07/31/2023 75.62  cm3 Final    LA Volume Index (Mod) 07/31/2023 44.0  mL/m2 Final    Left Atrium Major Axis 07/31/2023 4.26  cm Final    Left Atrium Minor Axis 07/31/2023 3.00  cm Final    RVDD 07/31/2023 2.31  cm Final    RA Major Axis 07/31/2023 3.51  cm Final    RA Width 07/31/2023 3.05  cm Final    AV regurgitation pressure 1/2 time 07/31/2023 513.259684800121444  ms Final    AR Max Howard 07/31/2023 4.76  m/s Final    AV peak gradient 07/31/2023 10  mmHg Final    Ao peak howard 07/31/2023 1.62  m/s Final    LVOT peak VTI 07/31/2023 19.00  cm Final    AV Velocity Ratio 07/31/2023 0.54   Final    SONU by Velocity Ratio 07/31/2023 1.13  cm² Final    Mr max howard 07/31/2023 2.73  m/s Final    MV stenosis pressure 1/2 time 07/31/2023 68.37  ms Final    MV valve area p 1/2 method 07/31/2023 3.22  cm2 Final    Triscuspid Valve Regurgitation Pea* 07/31/2023 23  mmHg Final    PV PEAK VELOCITY 07/31/2023 0.90  m/s Final    PV peak gradient 07/31/2023 3  mmHg Final    Ao root annulus 07/31/2023 2.85  cm Final    ZLVIDS 07/31/2023 -0.62   Final    ZLVIDD 07/31/2023 -2.27   Final    AORTIC VALVE CUSP SEPERATION 07/31/2023 1.46  cm Final    IVC diameter 07/31/2023 2.07  cm Final    RV TB RVSP 07/31/2023 17  mmHg Final    Est. RA pres 07/31/2023 15  mmHg Final    Troponin I 07/30/2023 <0.006  0.000 - 0.026 ng/mL Final    Comment: The reference interval for Troponin I represents the 99th percentile   cutoff   for our facility and is consistent with 3rd generation assay   performance.      WBC 07/31/2023 14.51 (H)  3.90 - 12.70 K/uL Final    RBC 07/31/2023 3.07  (L)  4.00 - 5.40 M/uL Final    Hemoglobin 07/31/2023 9.7 (L)  12.0 - 16.0 g/dL Final    Hematocrit 07/31/2023 30.1 (L)  37.0 - 48.5 % Final    MCV 07/31/2023 98  82 - 98 fL Final    MCH 07/31/2023 31.6 (H)  27.0 - 31.0 pg Final    MCHC 07/31/2023 32.2  32.0 - 36.0 g/dL Final    RDW 07/31/2023 14.1  11.5 - 14.5 % Final    Platelets 07/31/2023 292  150 - 450 K/uL Final    MPV 07/31/2023 11.2  9.2 - 12.9 fL Final    Immature Granulocytes 07/31/2023 0.4  0.0 - 0.5 % Final    Gran # (ANC) 07/31/2023 11.3 (H)  1.8 - 7.7 K/uL Final    Immature Grans (Abs) 07/31/2023 0.06 (H)  0.00 - 0.04 K/uL Final    Comment: Mild elevation in immature granulocytes is non specific and   can be seen in a variety of conditions including stress response,   acute inflammation, trauma and pregnancy. Correlation with other   laboratory and clinical findings is essential.      Lymph # 07/31/2023 2.7  1.0 - 4.8 K/uL Final    Mono # 07/31/2023 0.5  0.3 - 1.0 K/uL Final    Eos # 07/31/2023 0.0  0.0 - 0.5 K/uL Final    Baso # 07/31/2023 0.01  0.00 - 0.20 K/uL Final    nRBC 07/31/2023 0  0 /100 WBC Final    Gran % 07/31/2023 77.6 (H)  38.0 - 73.0 % Final    Lymph % 07/31/2023 18.3  18.0 - 48.0 % Final    Mono % 07/31/2023 3.4 (L)  4.0 - 15.0 % Final    Eosinophil % 07/31/2023 0.2  0.0 - 8.0 % Final    Basophil % 07/31/2023 0.1  0.0 - 1.9 % Final    Differential Method 07/31/2023 Automated   Final    Sodium 07/31/2023 142  136 - 145 mmol/L Final    Potassium 07/31/2023 4.0  3.5 - 5.1 mmol/L Final    Chloride 07/31/2023 107  95 - 110 mmol/L Final    CO2 07/31/2023 22 (L)  23 - 29 mmol/L Final    Glucose 07/31/2023 131 (H)  70 - 110 mg/dL Final    BUN 07/31/2023 32 (H)  6 - 20 mg/dL Final    Creatinine 07/31/2023 0.9  0.5 - 1.4 mg/dL Final    Calcium 07/31/2023 9.2  8.7 - 10.5 mg/dL Final    Anion Gap 07/31/2023 13  8 - 16 mmol/L Final    eGFR 07/31/2023 >60.0  >60 mL/min/1.73 m^2 Final    WBC 08/01/2023 11.94  3.90 - 12.70 K/uL Final    RBC 08/01/2023  3.91 (L)  4.00 - 5.40 M/uL Final    Hemoglobin 08/01/2023 12.3  12.0 - 16.0 g/dL Final    Hematocrit 08/01/2023 37.9  37.0 - 48.5 % Final    MCV 08/01/2023 97  82 - 98 fL Final    MCH 08/01/2023 31.5 (H)  27.0 - 31.0 pg Final    MCHC 08/01/2023 32.5  32.0 - 36.0 g/dL Final    RDW 08/01/2023 14.2  11.5 - 14.5 % Final    Platelets 08/01/2023 377  150 - 450 K/uL Final    MPV 08/01/2023 10.7  9.2 - 12.9 fL Final    Immature Granulocytes 08/01/2023 0.8 (H)  0.0 - 0.5 % Final    Gran # (ANC) 08/01/2023 8.7 (H)  1.8 - 7.7 K/uL Final    Immature Grans (Abs) 08/01/2023 0.10 (H)  0.00 - 0.04 K/uL Final    Comment: Mild elevation in immature granulocytes is non specific and   can be seen in a variety of conditions including stress response,   acute inflammation, trauma and pregnancy. Correlation with other   laboratory and clinical findings is essential.      Lymph # 08/01/2023 2.4  1.0 - 4.8 K/uL Final    Mono # 08/01/2023 0.6  0.3 - 1.0 K/uL Final    Eos # 08/01/2023 0.1  0.0 - 0.5 K/uL Final    Baso # 08/01/2023 0.04  0.00 - 0.20 K/uL Final    nRBC 08/01/2023 0  0 /100 WBC Final    Gran % 08/01/2023 73.2 (H)  38.0 - 73.0 % Final    Lymph % 08/01/2023 19.8  18.0 - 48.0 % Final    Mono % 08/01/2023 4.9  4.0 - 15.0 % Final    Eosinophil % 08/01/2023 1.0  0.0 - 8.0 % Final    Basophil % 08/01/2023 0.3  0.0 - 1.9 % Final    Differential Method 08/01/2023 Automated   Final    Sodium 08/01/2023 141  136 - 145 mmol/L Final    Potassium 08/01/2023 4.7  3.5 - 5.1 mmol/L Final    Chloride 08/01/2023 102  95 - 110 mmol/L Final    CO2 08/01/2023 28  23 - 29 mmol/L Final    Glucose 08/01/2023 102  70 - 110 mg/dL Final    BUN 08/01/2023 23 (H)  6 - 20 mg/dL Final    Creatinine 08/01/2023 0.9  0.5 - 1.4 mg/dL Final    Calcium 08/01/2023 9.2  8.7 - 10.5 mg/dL Final    Anion Gap 08/01/2023 11  8 - 16 mmol/L Final    eGFR 08/01/2023 >60.0  >60 mL/min/1.73 m^2 Final       EKG  EKG normal sinus rhythm normal rate nonspecific ST abnormality  predominantly lead V2 biphasic T-wave early repolarization borderline QT interval    Echo   Results for orders placed or performed during the hospital encounter of 07/30/23   Echo Saline Bubble? No   Result Value Ref Range    BSA 1.74 m2    LVOT stroke volume 39.63 cm3    LVIDd 3.80 3.5 - 6.0 cm    LV Systolic Volume 27.71 mL    LV Systolic Volume Index 16.1 mL/m2    LVIDs 2.73 2.1 - 4.0 cm    LV Diastolic Volume 61.81 mL    LV Diastolic Volume Index 35.94 mL/m2    IVS 1.30 (A) 0.6 - 1.1 cm    LVOT diameter 1.63 cm    LVOT area 2.1 cm2    FS 28 28 - 44 %    Left Ventricle Relative Wall Thickness 0.56 cm    Posterior Wall 1.06 0.6 - 1.1 cm    TDI LATERAL 0.11 m/s    TDI SEPTAL 0.09 m/s    LV mass 149.42 g    LV Mass Index 87 g/m2    MV Peak E Howard 0.72 m/s    LV LATERAL E/E' RATIO 6.55 m/s    LV SEPTAL E/E' RATIO 8.00 m/s    E/E' ratio 7.20 m/s    MV Peak A Howard 0.60 m/s    TR Max Howard 2.4 m/s    E/A ratio 1.20     Mean e' 0.10 m/s    E wave deceleration time 235.77 msec    PV Peak S Howard 0.90 m/s    PV Peak D Howard 0.64 m/s    Pulm vein S/D ratio 1.41     LVOT peak howard 0.88 m/s    Left Ventricular Outflow Tract Mean Velocity 0.59 cm/s    Left Ventricular Outflow Tract Mean Gradient 1.63 mmHg    LA volume (mod) 75.62 cm3    LA Volume Index (Mod) 44.0 mL/m2    Left Atrium Major Axis 4.26 cm    Left Atrium Minor Axis 3.00 cm    RVDD 2.31 cm    RA Major Axis 3.51 cm    RA Width 3.05 cm    AV regurgitation pressure 1/2 time [id number] ms    AR Max Howard 4.76 m/s    AV peak gradient 10 mmHg    Ao peak howard 1.62 m/s    LVOT peak VTI 19.00 cm    AV Velocity Ratio 0.54     SONU by Velocity Ratio 1.13 cm²    Mr max howard 2.73 m/s    MV stenosis pressure 1/2 time 68.37 ms    MV valve area p 1/2 method 3.22 cm2    Triscuspid Valve Regurgitation Peak Gradient 23 mmHg    PV PEAK VELOCITY 0.90 m/s    PV peak gradient 3 mmHg    Ao root annulus 2.85 cm    ZLVIDS -0.62     ZLVIDD -2.27     AORTIC VALVE CUSP SEPERATION 1.46 cm    IVC  diameter 2.07 cm    RV TB RVSP 17 mmHg    Est. RA pres 15 mmHg    Narrative      Left Ventricle: The left ventricle is normal in size. Mildly increased   wall thickness. Normal wall motion. There is normal systolic function with   a visually estimated ejection fraction of 55 - 60%. There is normal   diastolic function.    Left Atrium: Left atrium is moderately dilated.    Aortic Valve: There is mild aortic regurgitation.    Mitral Valve: There is no stenosis. There is mild regurgitation.    Right Ventricle: Normal right ventricular cavity size. Systolic   function is normal.    Tricuspid Valve: There is mild to moderate transvalvular regurgitation.    PASP is 38 mmHg.    IVC/SVC: Elevated venous pressure at 15 mmHg.         Imaging  X-Ray Chest 1 View    Result Date: 7/31/2023  EXAMINATION: XR CHEST 1 VIEW CLINICAL HISTORY: heart failure; TECHNIQUE: Single frontal view of the chest was performed. COMPARISON: 07/30/2023 FINDINGS: Monitoring EKG leads are present.  The trachea is unremarkable.  The cardiomediastinal silhouette is within normal limits.  The hemidiaphragms are unremarkable.  There is no evidence of free air beneath the hemidiaphragms.  There are no pleural effusions.  There is no evidence of a pneumothorax.  There is no evidence of pneumomediastinum.  No airspace opacity is present.  The osseous structures are unremarkable.     Unremarkable examination.  No evidence of failure. Electronically signed by: Spencer Bains MD Date:    07/31/2023 Time:    15:33    Echo Saline Bubble? No    Result Date: 7/31/2023    Left Ventricle: The left ventricle is normal in size. Mildly increased wall thickness. Normal wall motion. There is normal systolic function with a visually estimated ejection fraction of 55 - 60%. There is normal diastolic function.   Left Atrium: Left atrium is moderately dilated.   Aortic Valve: There is mild aortic regurgitation.   Mitral Valve: There is no stenosis. There is mild regurgitation.    Right Ventricle: Normal right ventricular cavity size. Systolic function is normal.   Tricuspid Valve: There is mild to moderate transvalvular regurgitation.   PASP is 38 mmHg.   IVC/SVC: Elevated venous pressure at 15 mmHg.     X-Ray Chest PA And Lateral    Result Date: 7/30/2023  EXAMINATION: XR CHEST PA AND LATERAL CLINICAL HISTORY: Shortness of breath TECHNIQUE: PA and lateral views of the chest were performed. COMPARISON: 09/19/2011 FINDINGS: Soft tissues of the patient's arms project over lateral view obscuring detail of the retrosternal airspace and mediastinal margins.  The cardiomediastinal silhouette is within normal limits of size and configuration.  The lungs are symmetrically expanded with diffuse increased interstitial and parenchymal attenuation which can be seen with interstitial edema although correlation for infectious process advised.  No confluent airspace consolidation or pneumothorax identified.  Possible trace pleural effusions.  Osseous structures are intact.     Please see above. Electronically signed by: Tequila Harrell MD Date:    07/30/2023 Time:    03:09      Prior coronary angiogram / intervention:  N/A    Assessment and Plan  1. Heart failure with preserved ejection fraction, unspecified HF chronicity  Euvolemic Lasix p.r.n.   Check BNP  Repeat echo if elevated  Otherwise will repeat echo next visit    2. Hypertension, unspecified type  Continue carvedilol 6.25 b.i.d., hydrochlorothiazide to 12.5, losartan 100  Check BMP  Blood pressure log call me with concerns    Total professional time spent for the encounter: 30 minutes  Time was spent preparing to see the patient, reviewing results of prior testing, obtaining and/or reviewing separately obtained history, performing a medically appropriate examination and interview, counseling and educating the patient/family, ordering medications/tests/procedures, referring and communicating with other health care professionals, documenting clinical  information in the electronic health record, and independently interpreting results.     Follow Up  6 months      Jg Shaffer MD, FACC, RPVI  Interventional Cardiology

## 2023-10-30 DIAGNOSIS — I50.30 HEART FAILURE WITH PRESERVED EJECTION FRACTION, UNSPECIFIED HF CHRONICITY: Primary | ICD-10-CM

## 2023-10-30 DIAGNOSIS — I10 HYPERTENSION, UNSPECIFIED TYPE: ICD-10-CM

## 2023-10-30 RX ORDER — FUROSEMIDE 20 MG/1
20 TABLET ORAL DAILY PRN
Qty: 30 TABLET | Refills: 0 | Status: SHIPPED | OUTPATIENT
Start: 2023-10-30 | End: 2023-12-21 | Stop reason: SDUPTHER

## 2023-10-30 RX ORDER — FUROSEMIDE 20 MG/1
20 TABLET ORAL DAILY PRN
Qty: 30 TABLET | Refills: 0
Start: 2023-10-30 | End: 2023-10-30

## 2023-10-30 RX ORDER — HYDROCHLOROTHIAZIDE 12.5 MG/1
12.5 TABLET ORAL DAILY
Qty: 90 TABLET | Refills: 3 | Status: SHIPPED | OUTPATIENT
Start: 2023-10-30 | End: 2023-12-13 | Stop reason: SDUPTHER

## 2023-11-08 DIAGNOSIS — I10 HYPERTENSION, UNSPECIFIED TYPE: ICD-10-CM

## 2023-11-08 RX ORDER — HYDROCHLOROTHIAZIDE 12.5 MG/1
12.5 TABLET ORAL DAILY
Qty: 90 TABLET | Refills: 3 | OUTPATIENT
Start: 2023-11-08 | End: 2024-11-07

## 2023-11-08 RX ORDER — CARVEDILOL 6.25 MG/1
6.25 TABLET ORAL 2 TIMES DAILY WITH MEALS
Qty: 180 TABLET | Refills: 3 | Status: SHIPPED | OUTPATIENT
Start: 2023-11-08 | End: 2024-11-07

## 2023-12-13 ENCOUNTER — OFFICE VISIT (OUTPATIENT)
Dept: PRIMARY CARE CLINIC | Facility: CLINIC | Age: 48
End: 2023-12-13
Payer: COMMERCIAL

## 2023-12-13 VITALS
RESPIRATION RATE: 16 BRPM | BODY MASS INDEX: 27.59 KG/M2 | TEMPERATURE: 98 F | HEART RATE: 51 BPM | OXYGEN SATURATION: 99 % | HEIGHT: 64 IN | WEIGHT: 161.63 LBS | DIASTOLIC BLOOD PRESSURE: 82 MMHG | SYSTOLIC BLOOD PRESSURE: 124 MMHG

## 2023-12-13 DIAGNOSIS — D64.9 NORMOCYTIC ANEMIA: ICD-10-CM

## 2023-12-13 DIAGNOSIS — I10 HYPERTENSION, UNSPECIFIED TYPE: ICD-10-CM

## 2023-12-13 DIAGNOSIS — I50.30 HEART FAILURE WITH PRESERVED EJECTION FRACTION, UNSPECIFIED HF CHRONICITY: ICD-10-CM

## 2023-12-13 DIAGNOSIS — Z11.59 NEED FOR HEPATITIS C SCREENING TEST: ICD-10-CM

## 2023-12-13 DIAGNOSIS — M54.2 CERVICAL PAIN (NECK): ICD-10-CM

## 2023-12-13 DIAGNOSIS — R74.01 TRANSAMINITIS: ICD-10-CM

## 2023-12-13 DIAGNOSIS — Z76.89 ENCOUNTER TO ESTABLISH CARE: Primary | ICD-10-CM

## 2023-12-13 DIAGNOSIS — Z11.4 ENCOUNTER FOR SCREENING FOR HIV: ICD-10-CM

## 2023-12-13 DIAGNOSIS — F32.1 CURRENT MODERATE EPISODE OF MAJOR DEPRESSIVE DISORDER, UNSPECIFIED WHETHER RECURRENT: ICD-10-CM

## 2023-12-13 DIAGNOSIS — R51.9 ACUTE INTRACTABLE HEADACHE, UNSPECIFIED HEADACHE TYPE: ICD-10-CM

## 2023-12-13 DIAGNOSIS — K21.9 GASTROESOPHAGEAL REFLUX DISEASE, UNSPECIFIED WHETHER ESOPHAGITIS PRESENT: ICD-10-CM

## 2023-12-13 DIAGNOSIS — E74.819 DISORDERS OF GLUCOSE TRANSPORT, UNSPECIFIED: ICD-10-CM

## 2023-12-13 DIAGNOSIS — G47.00 INSOMNIA, UNSPECIFIED TYPE: ICD-10-CM

## 2023-12-13 DIAGNOSIS — R79.89 ELEVATED TSH: ICD-10-CM

## 2023-12-13 PROBLEM — I16.0 HYPERTENSIVE URGENCY: Status: RESOLVED | Noted: 2023-08-10 | Resolved: 2023-12-13

## 2023-12-13 PROCEDURE — 99204 PR OFFICE/OUTPT VISIT, NEW, LEVL IV, 45-59 MIN: ICD-10-PCS | Mod: S$GLB,,, | Performed by: STUDENT IN AN ORGANIZED HEALTH CARE EDUCATION/TRAINING PROGRAM

## 2023-12-13 PROCEDURE — 99204 OFFICE O/P NEW MOD 45 MIN: CPT | Mod: S$GLB,,, | Performed by: STUDENT IN AN ORGANIZED HEALTH CARE EDUCATION/TRAINING PROGRAM

## 2023-12-13 PROCEDURE — 3008F PR BODY MASS INDEX (BMI) DOCUMENTED: ICD-10-PCS | Mod: CPTII,S$GLB,, | Performed by: STUDENT IN AN ORGANIZED HEALTH CARE EDUCATION/TRAINING PROGRAM

## 2023-12-13 PROCEDURE — 3079F PR MOST RECENT DIASTOLIC BLOOD PRESSURE 80-89 MM HG: ICD-10-PCS | Mod: CPTII,S$GLB,, | Performed by: STUDENT IN AN ORGANIZED HEALTH CARE EDUCATION/TRAINING PROGRAM

## 2023-12-13 PROCEDURE — 3074F PR MOST RECENT SYSTOLIC BLOOD PRESSURE < 130 MM HG: ICD-10-PCS | Mod: CPTII,S$GLB,, | Performed by: STUDENT IN AN ORGANIZED HEALTH CARE EDUCATION/TRAINING PROGRAM

## 2023-12-13 PROCEDURE — 99999 PR PBB SHADOW E&M-EST. PATIENT-LVL V: CPT | Mod: PBBFAC,,, | Performed by: STUDENT IN AN ORGANIZED HEALTH CARE EDUCATION/TRAINING PROGRAM

## 2023-12-13 PROCEDURE — 1160F PR REVIEW ALL MEDS BY PRESCRIBER/CLIN PHARMACIST DOCUMENTED: ICD-10-PCS | Mod: CPTII,S$GLB,, | Performed by: STUDENT IN AN ORGANIZED HEALTH CARE EDUCATION/TRAINING PROGRAM

## 2023-12-13 PROCEDURE — 3079F DIAST BP 80-89 MM HG: CPT | Mod: CPTII,S$GLB,, | Performed by: STUDENT IN AN ORGANIZED HEALTH CARE EDUCATION/TRAINING PROGRAM

## 2023-12-13 PROCEDURE — 4010F ACE/ARB THERAPY RXD/TAKEN: CPT | Mod: CPTII,S$GLB,, | Performed by: STUDENT IN AN ORGANIZED HEALTH CARE EDUCATION/TRAINING PROGRAM

## 2023-12-13 PROCEDURE — 99999 PR PBB SHADOW E&M-EST. PATIENT-LVL V: ICD-10-PCS | Mod: PBBFAC,,, | Performed by: STUDENT IN AN ORGANIZED HEALTH CARE EDUCATION/TRAINING PROGRAM

## 2023-12-13 PROCEDURE — 1159F PR MEDICATION LIST DOCUMENTED IN MEDICAL RECORD: ICD-10-PCS | Mod: CPTII,S$GLB,, | Performed by: STUDENT IN AN ORGANIZED HEALTH CARE EDUCATION/TRAINING PROGRAM

## 2023-12-13 PROCEDURE — 3008F BODY MASS INDEX DOCD: CPT | Mod: CPTII,S$GLB,, | Performed by: STUDENT IN AN ORGANIZED HEALTH CARE EDUCATION/TRAINING PROGRAM

## 2023-12-13 PROCEDURE — 1159F MED LIST DOCD IN RCRD: CPT | Mod: CPTII,S$GLB,, | Performed by: STUDENT IN AN ORGANIZED HEALTH CARE EDUCATION/TRAINING PROGRAM

## 2023-12-13 PROCEDURE — 4010F PR ACE/ARB THEARPY RXD/TAKEN: ICD-10-PCS | Mod: CPTII,S$GLB,, | Performed by: STUDENT IN AN ORGANIZED HEALTH CARE EDUCATION/TRAINING PROGRAM

## 2023-12-13 PROCEDURE — 3074F SYST BP LT 130 MM HG: CPT | Mod: CPTII,S$GLB,, | Performed by: STUDENT IN AN ORGANIZED HEALTH CARE EDUCATION/TRAINING PROGRAM

## 2023-12-13 PROCEDURE — 1160F RVW MEDS BY RX/DR IN RCRD: CPT | Mod: CPTII,S$GLB,, | Performed by: STUDENT IN AN ORGANIZED HEALTH CARE EDUCATION/TRAINING PROGRAM

## 2023-12-13 RX ORDER — HYDROCHLOROTHIAZIDE 12.5 MG/1
12.5 TABLET ORAL DAILY
Qty: 90 TABLET | Refills: 3 | Status: SHIPPED | OUTPATIENT
Start: 2023-12-13 | End: 2024-12-12

## 2023-12-13 RX ORDER — BUPROPION HYDROCHLORIDE 150 MG/1
150 TABLET ORAL EVERY MORNING
Qty: 90 TABLET | Refills: 3 | Status: SHIPPED | OUTPATIENT
Start: 2023-12-13

## 2023-12-13 RX ORDER — FAMOTIDINE 40 MG/1
40 TABLET, FILM COATED ORAL DAILY
Qty: 90 TABLET | Refills: 3 | Status: SHIPPED | OUTPATIENT
Start: 2023-12-13 | End: 2024-12-12

## 2023-12-13 RX ORDER — TIZANIDINE 4 MG/1
4 TABLET ORAL NIGHTLY
Qty: 90 TABLET | Refills: 3 | Status: SHIPPED | OUTPATIENT
Start: 2023-12-13

## 2023-12-13 RX ORDER — TIZANIDINE 4 MG/1
4 TABLET ORAL DAILY
COMMUNITY
End: 2023-12-13 | Stop reason: SDUPTHER

## 2023-12-13 RX ORDER — GABAPENTIN 100 MG/1
100 CAPSULE ORAL NIGHTLY
Qty: 90 CAPSULE | Refills: 3 | Status: SHIPPED | OUTPATIENT
Start: 2023-12-13

## 2023-12-13 RX ORDER — LOSARTAN POTASSIUM 100 MG/1
100 TABLET ORAL DAILY
Qty: 90 TABLET | Refills: 3 | Status: SHIPPED | OUTPATIENT
Start: 2023-12-13 | End: 2024-12-12

## 2023-12-13 NOTE — PROGRESS NOTES
"Subjective:           Patient ID: Lluvia Sheehan is a 48 y.o. female who presents today with a chief complaint of Establish Care  .    Chief Complaint:   Establish Care      History of Present Illness:    Lluvia Sheehan is a 48 y.o. female who presents today with a chief complaint of Establish Care  .      Was hosp w/ CHF and is using Lasix 20mg PRN, typically takes about every other day by watching fluids.    Hx of C3, C4, C5 and seeing neurosurgery.    Taking Tizanidine 4mg nightly and the Gabapentin 100mg nightly.    Taking Wellbutrin 150mg for depression and PTSD.  Reports has tried to get     Was started on Clonazepam 0.5mg nightly in hospital for breathing concerns,  has not been using it.       Review of Systems   Constitutional:  Negative for activity change, fatigue, fever and unexpected weight change.   HENT:  Negative for congestion, nosebleeds, sinus pressure and sneezing.    Respiratory:  Negative for cough, shortness of breath and wheezing.    Cardiovascular:  Negative for chest pain, palpitations and leg swelling.   Gastrointestinal:  Negative for abdominal distention, constipation, diarrhea and nausea.   Genitourinary:  Negative for difficulty urinating and dysuria.   Musculoskeletal:  Negative for back pain and gait problem.   Skin:  Negative for pallor and rash.   Neurological:  Positive for headaches. Negative for weakness and numbness.   Psychiatric/Behavioral:  Positive for dysphoric mood. Negative for agitation. The patient is not nervous/anxious.            Objective:        Vitals:    12/13/23 1555   BP: 124/82   BP Location: Right arm   Patient Position: Sitting   BP Method: Medium (Manual)   Pulse: (!) 51   Resp: 16   Temp: 98.2 °F (36.8 °C)   TempSrc: Temporal   SpO2: 99%   Weight: 73.3 kg (161 lb 9.6 oz)   Height: 5' 4" (1.626 m)       Body mass index is 27.74 kg/m².      Physical Exam  Vitals reviewed.   Constitutional:       General: She is not in acute distress.     Appearance: Normal " appearance.   HENT:      Head: Normocephalic and atraumatic.      Right Ear: External ear normal.      Left Ear: External ear normal.      Nose: No rhinorrhea.      Mouth/Throat:      Mouth: Mucous membranes are moist.   Eyes:      Extraocular Movements: Extraocular movements intact.      Conjunctiva/sclera: Conjunctivae normal.   Cardiovascular:      Rate and Rhythm: Normal rate and regular rhythm.      Pulses: Normal pulses.      Heart sounds: Murmur (mild) heard.   Pulmonary:      Effort: Pulmonary effort is normal. No respiratory distress.   Abdominal:      Tenderness: There is no right CVA tenderness or left CVA tenderness.   Musculoskeletal:      Right lower leg: Edema (trace) present.      Left lower leg: No edema (trace).   Skin:     Capillary Refill: Capillary refill takes less than 2 seconds.      Coloration: Skin is not jaundiced.      Findings: No bruising.   Neurological:      General: No focal deficit present.      Mental Status: She is alert and oriented to person, place, and time.      Motor: No weakness.      Gait: Gait normal.   Psychiatric:         Mood and Affect: Mood normal.             Lab Results   Component Value Date     08/25/2023    K 4.7 08/25/2023     08/25/2023    CO2 22 (L) 08/25/2023    BUN 14 08/25/2023    CREATININE 0.9 08/25/2023    ANIONGAP 11 08/25/2023     Lab Results   Component Value Date    HGBA1C 4.9 03/02/2015     Lab Results   Component Value Date    BNP 11 08/25/2023     (H) 07/30/2023    BNP 20 01/09/2009       Lab Results   Component Value Date    WBC 11.94 08/01/2023    HGB 12.3 08/01/2023    HCT 37.9 08/01/2023     08/01/2023    GRAN 8.7 (H) 08/01/2023    GRAN 73.2 (H) 08/01/2023     Lab Results   Component Value Date    CHOL 183 03/02/2015    HDL 70 03/02/2015    LDLCALC 97.2 03/02/2015    TRIG 79 03/02/2015          Current Outpatient Medications:     carvediloL (COREG) 6.25 MG tablet, Take 1 tablet (6.25 mg total) by mouth 2 (two) times  daily with meals., Disp: 180 tablet, Rfl: 3    furosemide (LASIX) 20 MG tablet, Take 1 tablet (20 mg total) by mouth daily as needed (edema)., Disp: 30 tablet, Rfl: 0    buPROPion (WELLBUTRIN XL) 150 MG TB24 tablet, Take 1 tablet (150 mg total) by mouth every morning., Disp: 90 tablet, Rfl: 3    famotidine (PEPCID) 40 MG tablet, Take 1 tablet (40 mg total) by mouth once daily., Disp: 90 tablet, Rfl: 3    gabapentin (NEURONTIN) 100 MG capsule, Take 1 capsule (100 mg total) by mouth every evening., Disp: 90 capsule, Rfl: 3    hydroCHLOROthiazide (HYDRODIURIL) 12.5 MG Tab, Take 1 tablet (12.5 mg total) by mouth once daily., Disp: 90 tablet, Rfl: 3    losartan (COZAAR) 100 MG tablet, Take 1 tablet (100 mg total) by mouth once daily., Disp: 90 tablet, Rfl: 3    tiZANidine (ZANAFLEX) 4 MG tablet, Take 1 tablet (4 mg total) by mouth every evening., Disp: 90 tablet, Rfl: 3     Outpatient Encounter Medications as of 12/13/2023   Medication Sig Dispense Refill    carvediloL (COREG) 6.25 MG tablet Take 1 tablet (6.25 mg total) by mouth 2 (two) times daily with meals. 180 tablet 3    furosemide (LASIX) 20 MG tablet Take 1 tablet (20 mg total) by mouth daily as needed (edema). 30 tablet 0    [DISCONTINUED] buPROPion (WELLBUTRIN XL) 150 MG TB24 tablet Take 150 mg by mouth every morning.      [DISCONTINUED] clonazePAM (KLONOPIN) 0.5 MG tablet Take 1 tablet (0.5 mg total) by mouth every evening. for 14 days 14 tablet 0    [DISCONTINUED] gabapentin (NEURONTIN) 100 MG capsule Take 100 mg by mouth.      [DISCONTINUED] hydroCHLOROthiazide (HYDRODIURIL) 12.5 MG Tab Take 1 tablet (12.5 mg total) by mouth once daily. 90 tablet 3    [DISCONTINUED] losartan (COZAAR) 100 MG tablet Take 1 tablet (100 mg total) by mouth once daily. 90 tablet 3    [DISCONTINUED] tiZANidine (ZANAFLEX) 4 MG tablet Take 4 mg by mouth once daily.      buPROPion (WELLBUTRIN XL) 150 MG TB24 tablet Take 1 tablet (150 mg total) by mouth every morning. 90 tablet 3     famotidine (PEPCID) 40 MG tablet Take 1 tablet (40 mg total) by mouth once daily. 90 tablet 3    gabapentin (NEURONTIN) 100 MG capsule Take 1 capsule (100 mg total) by mouth every evening. 90 capsule 3    hydroCHLOROthiazide (HYDRODIURIL) 12.5 MG Tab Take 1 tablet (12.5 mg total) by mouth once daily. 90 tablet 3    losartan (COZAAR) 100 MG tablet Take 1 tablet (100 mg total) by mouth once daily. 90 tablet 3    tiZANidine (ZANAFLEX) 4 MG tablet Take 1 tablet (4 mg total) by mouth every evening. 90 tablet 3    [DISCONTINUED] mirtazapine (REMERON) 30 MG tablet Take 30 mg by mouth.      [DISCONTINUED] ondansetron (ZOFRAN) 4 MG tablet Take 1 tablet (4 mg total) by mouth every 6 (six) hours. 12 tablet 0    [DISCONTINUED] QULIPTA 60 mg Tab Take 1 tablet by mouth.       No facility-administered encounter medications on file as of 12/13/2023.          Assessment:       1. Encounter to establish care    2. Gastroesophageal reflux disease, unspecified whether esophagitis present    3. Hypertension, unspecified type    4. Heart failure with preserved ejection fraction, unspecified HF chronicity    5. Current moderate episode of major depressive disorder, unspecified whether recurrent    6. Acute intractable headache, unspecified headache type    7. Normocytic anemia    8. Need for hepatitis C screening test    9. 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 Cervical pain (neck)    12. Insomnia, unspecified type    13. Transaminitis    14. Elevated TSH           Plan:       Encounter to establish care    Gastroesophageal reflux disease, unspecified whether esophagitis present  -     famotidine (PEPCID) 40 MG tablet; Take 1 tablet (40 mg total) by mouth once daily.  Dispense: 90 tablet; Refill: 3    Hypertension, unspecified type  -     hydroCHLOROthiazide (HYDRODIURIL) 12.5 MG Tab; Take 1 tablet (12.5 mg total) by mouth once daily.  Dispense: 90 tablet; Refill: 3  -     losartan  (COZAAR) 100 MG tablet; Take 1 tablet (100 mg total) by mouth once daily.  Dispense: 90 tablet; Refill: 3    Heart failure with preserved ejection fraction, unspecified HF chronicity    Current moderate episode of major depressive disorder, unspecified whether recurrent  -     buPROPion (WELLBUTRIN XL) 150 MG TB24 tablet; Take 1 tablet (150 mg total) by mouth every morning.  Dispense: 90 tablet; Refill: 3  -     Ambulatory referral/consult to Primary Care Behavioral Health (Non-Opioids); Future; Expected date: 12/20/2023    Acute intractable headache, unspecified headache type    Normocytic anemia  -     CBC Auto Differential; Future; Expected date: 12/13/2023  -     Comprehensive Metabolic Panel; Future; Expected date: 12/13/2023  -     Lipid Panel; Future; Expected date: 12/13/2023  -     Hemoglobin A1C; Future; Expected date: 12/13/2023  -     TSH; Future; Expected date: 12/13/2023  -     T4, Free; Future; Expected date: 12/13/2023  -     T3; Future; Expected date: 12/13/2023    Need for hepatitis C screening test  -     Hepatitis C Antibody; Future; Expected date: 12/13/2023    Encounter for screening for HIV  -     HIV 1/2 Ag/Ab (4th Gen); Future; Expected date: 12/13/2023    Disorders of glucose transport, unspecified  -     Hemoglobin A1C; Future; Expected date: 12/13/2023    Cervical pain (neck)  -     tiZANidine (ZANAFLEX) 4 MG tablet; Take 1 tablet (4 mg total) by mouth every evening.  Dispense: 90 tablet; Refill: 3  -     gabapentin (NEURONTIN) 100 MG capsule; Take 1 capsule (100 mg total) by mouth every evening.  Dispense: 90 capsule; Refill: 3    Insomnia, unspecified type  -     tiZANidine (ZANAFLEX) 4 MG tablet; Take 1 tablet (4 mg total) by mouth every evening.  Dispense: 90 tablet; Refill: 3    Transaminitis  -     Comprehensive Metabolic Panel; Future; Expected date: 12/13/2023  -     Lipid Panel; Future; Expected date: 12/13/2023  -     TSH; Future; Expected date: 12/13/2023  -     T4, Free;  Future; Expected date: 12/13/2023  -     T3; Future; Expected date: 12/13/2023    Elevated TSH  -     TSH; Future; Expected date: 12/13/2023  -     T4, Free; Future; Expected date: 12/13/2023  -     T3; Future; Expected date: 12/13/2023             Est Care:   - new patient present to est care.    Gastroesophageal reflux disease, unspecified whether esophagitis present  -     famotidine (PEPCID) 40 MG tablet; Take 1 tablet (40 mg total) by mouth once daily.  Dispense: 90 tablet; Refill: 3   - hx GERD, was on PPI before, now just using Pepto PRN.  Would advise daily H2 blocker first then see if effective.     Hypertension, unspecified type  -     hydroCHLOROthiazide (HYDRODIURIL) 12.5 MG Tab; Take 1 tablet (12.5 mg total) by mouth once daily.  Dispense: 90 tablet; Refill: 3  -     losartan (COZAAR) 100 MG tablet; Take 1 tablet (100 mg total) by mouth once daily.  Dispense: 90 tablet; Refill: 3   - BP controlled on partial meds as is out of Losartan and HCTZ.   - advised can restart these meds but watch BP and reintroduce 1 blood pressure medication at time until clear they are being tolerated then introduced the 2nd.    Heart failure with preserved ejection fraction, unspecified HF chronicity   - continue follow direction of Cardiology.    Current moderate episode of major depressive disorder, unspecified whether recurrent  -     buPROPion (WELLBUTRIN XL) 150 MG TB24 tablet; Take 1 tablet (150 mg total) by mouth every morning.  Dispense: 90 tablet; Refill: 3   - would possibly benefit from meeting with counselor.  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   - discussed with our counselor in clinic, will attempt to establish relationship of care with [facility], though may need to pass on to longer term provider in the future.    Acute intractable headache, unspecified headache type    Normocytic anemia  -    CBC, CMP, TSH, T3, T4, Lipids, A1c.     - chronic anemia.  Will check blood levels.    Need for hepatitis C screening test  -     Hepatitis C Antibody; Future; Expected date: 12/13/2023    Encounter for screening for HIV  -     HIV 1/2 Ag/Ab (4th Gen); Future; Expected date: 12/13/2023    Disorders of glucose transport, unspecified  -     Hemoglobin A1C; Future; Expected date: 12/13/2023    Cervical pain (neck)  -     tiZANidine (ZANAFLEX) 4 MG tablet; Take 1 tablet (4 mg total) by mouth every evening.  Dispense: 90 tablet; Refill: 3  -     gabapentin (NEURONTIN) 100 MG capsule; Take 1 capsule (100 mg total) by mouth every evening.  Dispense: 90 capsule; Refill: 3    Insomnia, unspecified type  -     tiZANidine (ZANAFLEX) 4 MG tablet; Take 1 tablet (4 mg total) by mouth every evening.  Dispense: 90 tablet; Refill: 3    Transaminitis  -    CBC, CMP, TSH, T3, T4, Lipids, A1c.    - rechecking lipids, previous labs showed elevated liver enzymes.    Elevated TSH  -    CBC, CMP, TSH, T3, T4, Lipids, A1c.    - rechecking thyroid, last thyroid was slightly elevated.

## 2023-12-14 ENCOUNTER — PATIENT MESSAGE (OUTPATIENT)
Dept: BEHAVIORAL HEALTH | Facility: CLINIC | Age: 48
End: 2023-12-14
Payer: COMMERCIAL

## 2023-12-14 ENCOUNTER — TELEPHONE (OUTPATIENT)
Dept: BEHAVIORAL HEALTH | Facility: CLINIC | Age: 48
End: 2023-12-14
Payer: COMMERCIAL

## 2023-12-14 NOTE — PROGRESS NOTES
Contacted patient for [facility] no answer left VM.  Sent portal message to pls call about referral by Dr. NAYELY Navarro.

## 2023-12-14 NOTE — PATIENT INSTRUCTIONS
Est Care:   - new patient present to est care.    Gastroesophageal reflux disease, unspecified whether esophagitis present  -     famotidine (PEPCID) 40 MG tablet; Take 1 tablet (40 mg total) by mouth once daily.  Dispense: 90 tablet; Refill: 3   - hx GERD, was on PPI before, now just using Pepto PRN.  Would advise daily H2 blocker first then see if effective.     Hypertension, unspecified type  -     hydroCHLOROthiazide (HYDRODIURIL) 12.5 MG Tab; Take 1 tablet (12.5 mg total) by mouth once daily.  Dispense: 90 tablet; Refill: 3  -     losartan (COZAAR) 100 MG tablet; Take 1 tablet (100 mg total) by mouth once daily.  Dispense: 90 tablet; Refill: 3   - BP controlled on partial meds as is out of Losartan and HCTZ.   - advised can restart these meds but watch BP and reintroduce 1 blood pressure medication at time until clear they are being tolerated then introduced the 2nd.    Heart failure with preserved ejection fraction, unspecified HF chronicity   - continue follow direction of Cardiology.    Current moderate episode of major depressive disorder, unspecified whether recurrent  -     buPROPion (WELLBUTRIN XL) 150 MG TB24 tablet; Take 1 tablet (150 mg total) by mouth every morning.  Dispense: 90 tablet; Refill: 3   - would possibly benefit from meeting with counselor.  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   - discussed with our counselor in clinic, will attempt to establish relationship of care with [facility], though may need to pass on to longer term provider in the future.    Acute intractable headache, unspecified headache type    Normocytic anemia  -    CBC, CMP, TSH, T3, T4, Lipids, A1c.    - chronic anemia.  Will check blood levels.    Need for hepatitis C screening test  -     Hepatitis C Antibody; Future; Expected date: 12/13/2023    Encounter for screening for HIV  -     HIV 1/2 Ag/Ab (4th Gen); Future; Expected date:  12/13/2023    Disorders of glucose transport, unspecified  -     Hemoglobin A1C; Future; Expected date: 12/13/2023    Cervical pain (neck)  -     tiZANidine (ZANAFLEX) 4 MG tablet; Take 1 tablet (4 mg total) by mouth every evening.  Dispense: 90 tablet; Refill: 3  -     gabapentin (NEURONTIN) 100 MG capsule; Take 1 capsule (100 mg total) by mouth every evening.  Dispense: 90 capsule; Refill: 3    Insomnia, unspecified type  -     tiZANidine (ZANAFLEX) 4 MG tablet; Take 1 tablet (4 mg total) by mouth every evening.  Dispense: 90 tablet; Refill: 3    Transaminitis  -    CBC, CMP, TSH, T3, T4, Lipids, A1c.    - rechecking lipids, previous labs showed elevated liver enzymes.    Elevated TSH  -    CBC, CMP, TSH, T3, T4, Lipids, A1c.    - rechecking thyroid, last thyroid was slightly elevated.

## 2023-12-14 NOTE — PROGRESS NOTES
CHW had a missed call from pt, pt cannot to intake at the moment. Pt was given my office hours 8a-5pm and stated she will call back to do intake and schedule an appt.

## 2023-12-16 ENCOUNTER — HOSPITAL ENCOUNTER (EMERGENCY)
Facility: HOSPITAL | Age: 48
Discharge: HOME | End: 2023-12-16
Attending: STUDENT IN AN ORGANIZED HEALTH CARE EDUCATION/TRAINING PROGRAM
Payer: COMMERCIAL

## 2023-12-16 VITALS
RESPIRATION RATE: 18 BRPM | SYSTOLIC BLOOD PRESSURE: 136 MMHG | HEART RATE: 76 BPM | OXYGEN SATURATION: 97 % | TEMPERATURE: 99 F | DIASTOLIC BLOOD PRESSURE: 79 MMHG

## 2023-12-16 DIAGNOSIS — U07.1 COVID-19 VIRUS DETECTED: ICD-10-CM

## 2023-12-16 DIAGNOSIS — U07.1 COVID-19: Primary | ICD-10-CM

## 2023-12-16 LAB
B-HCG UR QL: NEGATIVE
CTP QC/QA: YES
GROUP A STREP, MOLECULAR: NEGATIVE
INFLUENZA A, MOLECULAR: NEGATIVE
INFLUENZA B, MOLECULAR: NEGATIVE
SARS-COV-2 RDRP RESP QL NAA+PROBE: POSITIVE
SPECIMEN SOURCE: NORMAL

## 2023-12-16 PROCEDURE — 87651 STREP A DNA AMP PROBE: CPT | Performed by: PHYSICIAN ASSISTANT

## 2023-12-16 PROCEDURE — U0002 COVID-19 LAB TEST NON-CDC: HCPCS | Performed by: PHYSICIAN ASSISTANT

## 2023-12-16 PROCEDURE — 96372 THER/PROPH/DIAG INJ SC/IM: CPT | Performed by: PHYSICIAN ASSISTANT

## 2023-12-16 PROCEDURE — 99284 EMERGENCY DEPT VISIT MOD MDM: CPT

## 2023-12-16 PROCEDURE — 25000003 PHARM REV CODE 250: Performed by: PHYSICIAN ASSISTANT

## 2023-12-16 PROCEDURE — 63600175 PHARM REV CODE 636 W HCPCS: Performed by: PHYSICIAN ASSISTANT

## 2023-12-16 PROCEDURE — 81025 URINE PREGNANCY TEST: CPT | Performed by: PHYSICIAN ASSISTANT

## 2023-12-16 PROCEDURE — 87502 INFLUENZA DNA AMP PROBE: CPT | Performed by: PHYSICIAN ASSISTANT

## 2023-12-16 RX ORDER — ONDANSETRON 4 MG/1
4 TABLET, FILM COATED ORAL EVERY 6 HOURS
Qty: 20 TABLET | Refills: 0 | Status: SHIPPED | OUTPATIENT
Start: 2023-12-16 | End: 2023-12-21

## 2023-12-16 RX ORDER — ACETAMINOPHEN 500 MG
1000 TABLET ORAL
Status: COMPLETED | OUTPATIENT
Start: 2023-12-16 | End: 2023-12-16

## 2023-12-16 RX ORDER — KETOROLAC TROMETHAMINE 30 MG/ML
15 INJECTION, SOLUTION INTRAMUSCULAR; INTRAVENOUS
Status: COMPLETED | OUTPATIENT
Start: 2023-12-16 | End: 2023-12-16

## 2023-12-16 RX ORDER — ONDANSETRON 4 MG/1
4 TABLET, ORALLY DISINTEGRATING ORAL
Status: COMPLETED | OUTPATIENT
Start: 2023-12-16 | End: 2023-12-16

## 2023-12-16 RX ORDER — BENZONATATE 100 MG/1
100 CAPSULE ORAL 3 TIMES DAILY PRN
Qty: 20 CAPSULE | Refills: 0 | Status: SHIPPED | OUTPATIENT
Start: 2023-12-16 | End: 2023-12-26

## 2023-12-16 RX ADMIN — KETOROLAC TROMETHAMINE 15 MG: 30 INJECTION, SOLUTION INTRAMUSCULAR; INTRAVENOUS at 07:12

## 2023-12-16 RX ADMIN — ACETAMINOPHEN 1000 MG: 500 TABLET ORAL at 07:12

## 2023-12-16 RX ADMIN — ONDANSETRON 4 MG: 4 TABLET, ORALLY DISINTEGRATING ORAL at 07:12

## 2023-12-16 NOTE — Clinical Note
"Lluvia Mckeon" Aguila was seen and treated in our emergency department on 12/16/2023.  She may return to work on 12/20/2023.       If you have any questions or concerns, please don't hesitate to call.      Олег Gomes PA-C"

## 2023-12-17 NOTE — ED TRIAGE NOTES
Patient is a 48-year-old female presents to the ED with c/o flu like symptoms. Patient states symptoms started yesterday. Patient endorses 10/10 generalized body aches, chills, sore throat. Denies SOB or chest pain

## 2023-12-17 NOTE — ED PROVIDER NOTES
Encounter Date: 2023       History     Chief Complaint   Patient presents with    Generalized Body Aches    Sore Throat     Pt c/o 10/10 generalized body aches and sore throat x 1 day.     48-year-old female with past medical history of hypertension presents to the ED for 1 day of body aches, sore throat, cough and headaches.  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  Denies any productive cough, shortness of breath, fever, abdominal pain or chest pain.        Review of patient's allergies indicates:   Allergen Reactions    Lisinopril Other (See Comments)     Cough.  Mother also had angioedema.     Past Medical History:   Diagnosis Date    Hypertension      Past Surgical History:   Procedure Laterality Date     SECTION      x2    MINI-LAPAROTOMY      x3 for ruptured ectopic pregnancies    SALPINGECTOMY      SALPINGECTOMY       Family History   Problem Relation Age of Onset    Heart disease Mother     Hypertension Mother     Diabetes Mother     Heart disease Father     Stroke Father     Diabetes Father     Hypertension Sister     Thyroid disease Sister     Hypertension Sister     Leukemia Brother     Stroke Maternal Grandmother     Liver disease Maternal Grandfather     Breast cancer Neg Hx     Colon cancer Neg Hx     Ovarian cancer Neg Hx     Lung cancer Neg Hx      Social History     Tobacco Use    Smoking status: Never    Smokeless tobacco: Never   Substance Use Topics    Alcohol use: No    Drug use: No     Review of Systems   Constitutional:  Positive for chills and fatigue. Negative for fever.   HENT:  Positive for sore throat.    Eyes:  Negative for pain.   Respiratory:  Positive for cough. Negative for shortness of breath.    Cardiovascular:  Negative for chest pain.   Gastrointestinal:  Positive for nausea. Negative for abdominal pain.   Genitourinary:  Negative for difficulty urinating and dysuria.   Musculoskeletal:  Positive for myalgias.   Skin:  Negative.    Neurological:  Positive for headaches. Negative for weakness.   Psychiatric/Behavioral:  Negative for confusion.        Physical Exam     Initial Vitals [12/16/23 1903]   BP Pulse Resp Temp SpO2   (!) 143/88 84 16 99.3 °F (37.4 °C) 97 %      MAP       --         Physical Exam    Nursing note and vitals reviewed.  Constitutional: She appears well-developed and well-nourished.   HENT:   Head: Normocephalic and atraumatic.   Erythema to the posterior pharynx, uvula midline, no trismus, no sublingual swelling   Eyes: Conjunctivae are normal.   Neck: Neck supple.   Normal range of motion.  Cardiovascular:  Normal rate.           Pulmonary/Chest: Breath sounds normal.   Abdominal: Abdomen is soft. There is no abdominal tenderness.   Musculoskeletal:         General: Normal range of motion.      Cervical back: Normal range of motion and neck supple.     Neurological: She is alert and oriented to person, place, and time. GCS score is 15. GCS eye subscore is 4. GCS verbal subscore is 5. GCS motor subscore is 6.   Skin: Skin is warm and dry. Capillary refill takes less than 2 seconds.         ED Course   Procedures  Labs Reviewed   SARS-COV-2 RNA AMPLIFICATION, QUAL - Abnormal; Notable for the following components:       Result Value    SARS-CoV-2 RNA, Amplification, Qual Positive (*)     All other components within normal limits   INFLUENZA A & B BY MOLECULAR   GROUP A STREP, MOLECULAR   POCT URINE PREGNANCY          Imaging Results    None          Medications   acetaminophen tablet 1,000 mg (1,000 mg Oral Given 12/16/23 1928)   ondansetron disintegrating tablet 4 mg (4 mg Oral Given 12/16/23 1928)   ketorolac injection 15 mg (15 mg Intramuscular Given 12/16/23 1928)     Medical Decision Making  48-year-old female presents ED for body aches, cough, sore throat x1 day.      Differential includes but not limited to strep pharyngitis, COVID-19, influenza, viral syndrome     Patient's vital signs are reassuring.   She does have a low-grade temp but no tachycardia, tachypnea or hypoxia.  Low suspicion for pneumonia her lung sounds are clear to auscultation bilaterally.  No signs of PTA or deep space abscess on exam.  She is mild pharyngeal edema.  Her viral screening positive for COVID-19.  Discussed quarantine precautions will discharge with Zofran for nausea and recommended NSAIDs and OTC Tylenol for body aches and fever and will also give a course of Tessalon Perles for her cough.  Discussed PCP follow-up as needed.  Return ED precautions given.    Amount and/or Complexity of Data Reviewed  Labs: ordered. Decision-making details documented in ED Course.    Risk  OTC drugs.  Prescription drug management.               ED Course as of 12/16/23 2033   Sat Dec 16, 2023   1933 Preg Test, Ur: Negative [HJ]   2004 SARS-CoV-2 RNA, Amplification, Qual(!): Positive [HJ]   2033 Group A Strep, Molecular: Negative [HJ]   2033 Influenza A & B by Molecular [HJ]      ED Course User Index  [HJ] Олег Gomes PA-C                             Clinical Impression:  Final diagnoses:  [U07.1] COVID-19 (Primary)          ED Disposition Condition    Discharge Stable          ED Prescriptions       Medication Sig Dispense Start Date End Date Auth. Provider    ondansetron (ZOFRAN) 4 MG tablet Take 1 tablet (4 mg total) by mouth every 6 (six) hours. for 5 days 20 tablet 12/16/2023 12/21/2023 Олег Gomes PA-C    benzonatate (TESSALON) 100 MG capsule Take 1 capsule (100 mg total) by mouth 3 (three) times daily as needed for Cough. 20 capsule 12/16/2023 12/26/2023 Олег Gomes PA-C          Follow-up Information       Follow up With Specialties Details Why Contact Info    Anibal Navarro MD Family Medicine Schedule an appointment as soon as possible for a visit  As needed [address]  [address] [place] [id number] [phone]               Олег Gomes PA-C  12/16/23 2033

## 2023-12-18 ENCOUNTER — PATIENT MESSAGE (OUTPATIENT)
Dept: RESEARCH | Facility: HOSPITAL | Age: 48
End: 2023-12-18
Payer: COMMERCIAL

## 2023-12-19 ENCOUNTER — PATIENT MESSAGE (OUTPATIENT)
Dept: BEHAVIORAL HEALTH | Facility: CLINIC | Age: 48
End: 2023-12-19
Payer: COMMERCIAL

## 2023-12-21 DIAGNOSIS — I50.30 HEART FAILURE WITH PRESERVED EJECTION FRACTION, UNSPECIFIED HF CHRONICITY: ICD-10-CM

## 2023-12-21 RX ORDER — FUROSEMIDE 20 MG/1
20 TABLET ORAL DAILY PRN
Qty: 30 TABLET | Refills: 1 | Status: SHIPPED | OUTPATIENT
Start: 2023-12-21 | End: 2024-12-20

## 2023-12-22 ENCOUNTER — HOSPITAL ENCOUNTER (EMERGENCY)
Facility: HOSPITAL | Age: 48
Discharge: HOME | End: 2023-12-22
Attending: EMERGENCY MEDICINE
Payer: COMMERCIAL

## 2023-12-22 VITALS
BODY MASS INDEX: 27.64 KG/M2 | DIASTOLIC BLOOD PRESSURE: 80 MMHG | OXYGEN SATURATION: 98 % | TEMPERATURE: 99 F | WEIGHT: 161 LBS | SYSTOLIC BLOOD PRESSURE: 158 MMHG | RESPIRATION RATE: 17 BRPM | HEART RATE: 62 BPM

## 2023-12-22 DIAGNOSIS — H10.31 ACUTE BACTERIAL CONJUNCTIVITIS OF RIGHT EYE: Primary | ICD-10-CM

## 2023-12-22 PROCEDURE — 25000003 PHARM REV CODE 250: Performed by: EMERGENCY MEDICINE

## 2023-12-22 PROCEDURE — 99283 EMERGENCY DEPT VISIT LOW MDM: CPT

## 2023-12-22 RX ORDER — ERYTHROMYCIN 5 MG/G
OINTMENT OPHTHALMIC EVERY 6 HOURS
Qty: 3.5 G | Refills: 0 | Status: SHIPPED | OUTPATIENT
Start: 2023-12-22 | End: 2023-12-22

## 2023-12-22 RX ORDER — ERYTHROMYCIN 5 MG/G
OINTMENT OPHTHALMIC EVERY 6 HOURS
Qty: 3.5 G | Refills: 0 | Status: SHIPPED | OUTPATIENT
Start: 2023-12-22 | End: 2023-12-27

## 2023-12-22 RX ORDER — ERYTHROMYCIN 5 MG/G
OINTMENT OPHTHALMIC EVERY 6 HOURS
Status: DISCONTINUED | OUTPATIENT
Start: 2023-12-22 | End: 2023-12-22 | Stop reason: HOSPADM

## 2023-12-22 RX ADMIN — ERYTHROMYCIN: 5 OINTMENT OPHTHALMIC at 03:12

## 2023-12-22 NOTE — ED TRIAGE NOTES
Lluvia Sheehan, a 48 y.o. female presents to the ED w/ complaint of conjunctivitis. Reports discharge from [facility] x 1 day.     Adult Physical Assessment  LOC: Lluvia Sheehan, 48 y.o. female verified via two identifiers.  The patient is awake, alert, oriented and speaking appropriately at this time.  APPEARANCE: Patient resting comfortably and appears to be in no acute distress at this time. Patient is clean and well groomed, patient's clothing is properly fastened.  SKIN:The skin is warm and dry, color consistent with ethnicity, patient has normal skin turgor and moist mucus membranes, skin intact, no breakdown or brusing noted.  MUSCULOSKELETAL: Patient moving all extremities well, no obvious swelling or deformities noted.  RESPIRATORY: Airway is open and patent, respirations are spontaneous, patient has a normal effort and rate, no accessory muscle use noted.  CARDIAC: Patient has a normal rate and rhythm, no periphreal edema noted in any extremity, capillary refill < 3 seconds in all extremities  ABDOMEN: Soft and non tender to palpation, no abdominal distention noted. Bowel sounds present in all four quadrants.  NEUROLOGIC: Eyes open spontaneously, behavior appropriate to situation, follows commands, facial expression symmetrical, bilateral hand grasp equal and even, purposeful motor response noted, normal sensation in all extremities when touched with a finger. Reports discharge from [facility] x 1 day.       Triage note:  Chief Complaint   Patient presents with    Conjunctivitis     C/o conjunctivitis to the [facility] x 1      Review of patient's allergies indicates:   Allergen Reactions    Lisinopril Other (See Comments)     Cough.  Mother also had angioedema.     Past Medical History:   Diagnosis Date    Hypertension

## 2023-12-22 NOTE — ED PROVIDER NOTES
Encounter Date: 2023       History     Chief Complaint   Patient presents with    Conjunctivitis     C/o conjunctivitis to the R eye x 1      HPI    Pleasant 48-year-old female presents the ER for evaluation conjunctivitis to right eye.  Onset 1 day.  Patient recently diagnosed with COVID, she reports that her eye became in contact with mucus in since then has been having crusting and yellow-green discharge in the eye.  No other complaints came to the ER for further evaluation.     Review of patient's allergies indicates:   Allergen Reactions    Lisinopril Other (See Comments)     Cough.  Mother also had angioedema.     Past Medical History:   Diagnosis Date    Hypertension      Past Surgical History:   Procedure Laterality Date     SECTION      x2    MINI-LAPAROTOMY      x3 for ruptured ectopic pregnancies    SALPINGECTOMY      SALPINGECTOMY       Family History   Problem Relation Age of Onset    Heart disease Mother     Hypertension Mother     Diabetes Mother     Heart disease Father     Stroke Father     Diabetes Father     Hypertension Sister     Thyroid disease Sister     Hypertension Sister     Leukemia Brother     Stroke Maternal Grandmother     Liver disease Maternal Grandfather     Breast cancer Neg Hx     Colon cancer Neg Hx     Ovarian cancer Neg Hx     Lung cancer Neg Hx      Social History     Tobacco Use    Smoking status: Never    Smokeless tobacco: Never   Substance Use Topics    Alcohol use: No    Drug use: No     Review of Systems   Eyes:  Positive for discharge and redness.   All other systems reviewed and are negative.      Physical Exam     Initial Vitals [23 0223]   BP Pulse Resp Temp SpO2   (!) 187/86 (!) 59 20 98.4 °F (36.9 °C) 100 %      MAP       --         Physical Exam    Constitutional: She appears well-developed and well-nourished. No distress.   HENT:   Head: Normocephalic and atraumatic.   Eyes: EOM are normal. Pupils are equal, round, and reactive to light.   Left  eye grossly normal     Right eye there is erythema of the conjunctiva as well as yellow-green discharge consistent with conjunctivitis visual acuity grossly normal   Neck:   Normal range of motion.  Pulmonary/Chest: No respiratory distress.   Musculoskeletal:         General: Normal range of motion.      Cervical back: Normal range of motion.     Neurological: She is alert and oriented to person, place, and time. She has normal strength. GCS score is 15. GCS eye subscore is 4. GCS verbal subscore is 5. GCS motor subscore is 6.   Skin: Skin is warm and dry. Capillary refill takes less than 2 seconds.   Psychiatric: She has a normal mood and affect. Thought content normal.         ED Course   Procedures  Labs Reviewed   HIV 1 / 2 ANTIBODY   HEPATITIS C ANTIBODY          Imaging Results    None          Medications   erythromycin 5 mg/gram (0.5 %) ophthalmic ointment ( Right Eye Given 12/22/23 0306)     Medical Decision Making  Pleasant 48-year-old female presents the ER for evaluation of concerns of conjunctivitis.  Onset 1 day right eye red eye redness yellow-green discharge.  Physical exam consistent with conjunctivitis.  Patient does not wear contacts, no acute vision changes.  Will plan discharge home with erythromycin cream return precautions discussed patient understood agreed with plan.    Amount and/or Complexity of Data Reviewed  Independent Historian: friend    Risk  Prescription drug management.                                      Clinical Impression:  Final diagnoses:  [H10.31] Acute bacterial conjunctivitis of right eye (Primary)          ED Disposition Condition    Discharge Stable          ED Prescriptions       Medication Sig Dispense Start Date End Date Auth. Provider    erythromycin (ROMYCIN) ophthalmic ointment  (Status: Discontinued) Place into the right eye every 6 (six) hours. for 5 days 3.5 g 12/22/2023 12/22/2023 Td Wayne MD    erythromycin (ROMYCIN) ophthalmic ointment Place into  the right eye every 6 (six) hours. for 5 days 3.5 g 12/22/2023 12/27/2023 Td Wayne MD          Follow-up Information       Follow up With Specialties Details Why Contact Info    Anibal Navarro MD Family Medicine Schedule an appointment as soon as possible for a visit  As needed [address] [facility]  [address] [address]  [phone]               Td Wayne MD  12/22/23 0424

## 2023-12-27 ENCOUNTER — PATIENT MESSAGE (OUTPATIENT)
Dept: BEHAVIORAL HEALTH | Facility: CLINIC | Age: 48
End: 2023-12-27
Payer: COMMERCIAL

## 2024-02-02 ENCOUNTER — PATIENT OUTREACH (OUTPATIENT)
Dept: ADMINISTRATIVE | Facility: HOSPITAL | Age: 49
End: 2024-02-02
Payer: COMMERCIAL

## 2024-02-02 NOTE — PROGRESS NOTES
Health Maintenance Due   Topic Date Due    Hepatitis C Screening  Never done    COVID-19 Vaccine (1) Never done    Pneumococcal Vaccines (Age 0-64) (1 of 2 - PCV) Never done    HIV Screening  Never done    Hemoglobin A1c (Diabetic Prevention Screening)  03/09/2018    Cervical Cancer Screening  11/13/2019    Lipid Panel  03/09/2020    Colorectal Cancer Screening  Never done    Mammogram  03/15/2024        Chart review done.   HM updated.   Immunizations reviewed & updated.   Care Everywhere updated.  [facility] reviewed

## 2024-04-15 ENCOUNTER — PATIENT OUTREACH (OUTPATIENT)
Dept: ADMINISTRATIVE | Facility: HOSPITAL | Age: 49
End: 2024-04-15
Payer: COMMERCIAL

## 2024-04-15 NOTE — PROGRESS NOTES
Health Maintenance Due   Topic Date Due    Hepatitis C Screening  Never done    Pneumococcal Vaccines (Age 0-64) (1 of 2 - PCV) Never done    HIV Screening  Never done    Hemoglobin A1c (Diabetic Prevention Screening)  03/09/2018    Cervical Cancer Screening  11/13/2019    Lipid Panel  03/09/2020    Colorectal Cancer Screening  Never done    COVID-19 Vaccine (3 - 2023-24 season) 09/01/2023    Mammogram  03/15/2024        Chart review done.   HM updated.   Immunizations reviewed & updated.   Care Everywhere updated.

## 2024-06-26 DIAGNOSIS — I50.30 HEART FAILURE WITH PRESERVED EJECTION FRACTION, UNSPECIFIED HF CHRONICITY: ICD-10-CM

## 2024-06-26 RX ORDER — FUROSEMIDE 20 MG/1
20 TABLET ORAL DAILY PRN
Qty: 30 TABLET | Refills: 0 | Status: SHIPPED | OUTPATIENT
Start: 2024-06-26 | End: 2025-06-26

## 2024-06-26 NOTE — TELEPHONE ENCOUNTER
Next appt 08/20/2024  LOV 08/24/2023    Spoke with patient, scheduled overdue follow up appointment with provider.

## 2024-09-19 ENCOUNTER — PATIENT MESSAGE (OUTPATIENT)
Dept: PRIMARY CARE CLINIC | Facility: CLINIC | Age: 49
End: 2024-09-19
Payer: COMMERCIAL

## 2024-11-14 DIAGNOSIS — F32.1 CURRENT MODERATE EPISODE OF MAJOR DEPRESSIVE DISORDER, UNSPECIFIED WHETHER RECURRENT: ICD-10-CM

## 2024-11-14 RX ORDER — BUPROPION HYDROCHLORIDE 150 MG/1
TABLET ORAL
Qty: 30 TABLET | Refills: 0 | Status: SHIPPED | OUTPATIENT
Start: 2024-11-14

## 2024-11-15 ENCOUNTER — NURSE TRIAGE (OUTPATIENT)
Dept: ADMINISTRATIVE | Facility: CLINIC | Age: 49
End: 2024-11-15

## 2024-11-15 ENCOUNTER — TELEPHONE (OUTPATIENT)
Dept: PRIMARY CARE CLINIC | Facility: CLINIC | Age: 49
End: 2024-11-15

## 2024-11-15 NOTE — TELEPHONE ENCOUNTER
Was supposed to have fasting blood work today. Was told that she could have lab work done at [facility] and orders are not at the lab. Patient has been fasting all night and would like the order sent asa. [facility] states that the orders would have to be faxed to the lab at [phone]. Please call Lluvia at [phone] for any problems or questions. Call office at 815 am.   Reason for Disposition   [1] Caller requesting NON-URGENT health information AND [2] PCP's office is the best resource    Protocols used: Information Only Call - No Triage-A-

## 2024-11-15 NOTE — PROGRESS NOTES
Clinic Note  11/20/2024      Subjective:       Patient ID:  Lluvia is a 49 y.o. female being seen for an established visit.    Chief Complaint: Follow-up      Patient presents for a follow-up visit today. Has no acute concerns.    HTN-Blood pressure is elevated at visit today.  Patient is asymptomatic.  Seen Cardiology on November 15th.  No adjustments in medications were done at cardiology visit.  Patient states she Checks her BP while at work - 3-4x week and get numbers in 120s/70s. Patient reports she has been having elevated blood pressures due to headaches.      Headaches- Patient reports having more headaches.  Plans to see Neurosurgery on Wednesday.  Denies headache on today.      Cervical spine- Reports mild neck tenderness.  Relief with tizanidine.     Mood- Patient currently taking bupropion- would like to increase the dosage.  Denies any SI/HI. No a/e to meds.      Insomnia- improved on medication.     Routine screenings are also due.  Patient reports she is going to go see OBGYN December 13th for Pap smear and mammogram.  Colon screening due.  Cologuard ordered.  Reports receiving flu shot at work in October.  Declines COVID vaccine booster- accepts risks and benefits.         Review of Systems   Constitutional:  Negative for appetite change, fatigue, fever and unexpected weight change.   HENT:  Negative for hearing loss and sore throat.    Eyes:  Negative for pain and visual disturbance.   Respiratory:  Negative for cough, shortness of breath and wheezing.    Cardiovascular:  Negative for chest pain, palpitations and leg swelling.   Gastrointestinal:  Negative for abdominal pain and blood in stool.   Musculoskeletal:  Positive for neck pain.   Neurological:  Negative for dizziness and headaches (no headache today, taking fioricet with relief).   Psychiatric/Behavioral:  Negative for suicidal ideas.         Medication List with Changes/Refills   New Medications    VALSARTAN (DIOVAN) 320 MG TABLET    Take 1  tablet (320 mg total) by mouth once daily.   Current Medications    CARVEDILOL (COREG) 6.25 MG TABLET    Take 1 tablet (6.25 mg total) by mouth 2 (two) times daily with meals.    FAMOTIDINE (PEPCID) 40 MG TABLET    Take 1 tablet (40 mg total) by mouth once daily.    FUROSEMIDE (LASIX) 20 MG TABLET    Take 1 tablet (20 mg total) by mouth daily as needed (edema).    GABAPENTIN (NEURONTIN) 100 MG CAPSULE    Take 1 capsule (100 mg total) by mouth every evening.    HYDROCHLOROTHIAZIDE (HYDRODIURIL) 12.5 MG TAB    Take 1 tablet (12.5 mg total) by mouth once daily.    MEMANTINE (NAMENDA) 10 MG TAB    Take 1 tablet by  mouth twice daily   Changed and/or Refilled Medications    Modified Medication Previous Medication    BUPROPION (WELLBUTRIN XL) 150 MG TB24 TABLET buPROPion (WELLBUTRIN XL) 150 MG TB24 tablet       Take 2 tablets (300 mg total) by mouth once daily.    TAKE 1  BY MOUTH IN THE MORNING    TIZANIDINE (ZANAFLEX) 4 MG TABLET tiZANidine (ZANAFLEX) 4 MG tablet       Take 1 tablet (4 mg total) by mouth every evening.    Take 1 tablet (4 mg total) by mouth every evening.   Discontinued Medications    IBUPROFEN (ADVIL,MOTRIN) 800 MG TABLET    Take 1 tablet (800 mg total) by mouth every 8 (eight) hours as needed for pain.    LOSARTAN (COZAAR) 100 MG TABLET    Take 1 tablet (100 mg total) by mouth once daily.    METOPROLOL TARTRATE (LOPRESSOR) 25 MG TABLET    Take 1 tablet by mouth twice daily    RIMEGEPANT 75 MG ODT    Dissolve 1 tablet by mouth every other day for 30 days    SERTRALINE (ZOLOFT) 50 MG TABLET    Take 1 tablet by mouth once daily in the morning     Losartan changed to valsartan on today for better BP coverage .    Patient Active Problem List   Diagnosis    Headache    Ovarian cyst    (HFpEF) heart failure with preserved ejection fraction    HTN (hypertension)    Gastroesophageal reflux disease           Objective:      BP (!) 148/78 (BP Location: Right arm, Patient Position: Sitting)   Pulse (!) 58   Ht  "5' 3" (1.6 m)   Wt 72.3 kg (159 lb 4.5 oz)   SpO2 99%   BMI 28.22 kg/m²   Estimated body mass index is 28.22 kg/m² as calculated from the following:    Height as of this encounter: 5' 3" (1.6 m).    Weight as of this encounter: 72.3 kg (159 lb 4.5 oz).  Physical Exam  Vitals and nursing note reviewed.   Constitutional:       General: She is not in acute distress.  HENT:      Head: Atraumatic.   Cardiovascular:      Rate and Rhythm: Normal rate and regular rhythm.      Heart sounds: No murmur heard.  Pulmonary:      Effort: Pulmonary effort is normal. No respiratory distress.      Breath sounds: Normal breath sounds. No wheezing, rhonchi or rales.   Musculoskeletal:         General: Normal range of motion.      Cervical back: Normal range of motion. Tenderness present.      Right lower leg: No edema.      Left lower leg: No edema.   Neurological:      Mental Status: She is alert and oriented to person, place, and time.      Gait: Gait normal.   Psychiatric:         Mood and Affect: Mood normal.         Thought Content: Thought content normal.             Assessment and Plan:         Problem List Items Addressed This Visit          Cardiac/Vascular    (HFpEF) heart failure with preserved ejection fraction    Relevant Medications    valsartan (DIOVAN) 320 MG tablet    HTN (hypertension) - Primary    Relevant Medications    valsartan (DIOVAN) 320 MG tablet    D/c Losartan 100mg and changed to valsartan 320 mg for better BP coverage .     Other Visit Diagnoses       Encounter for screening for malignant neoplasm of breast, unspecified screening modality        Current moderate episode of major depressive disorder, unspecified whether recurrent        Relevant Medications    buPROPion (WELLBUTRIN XL) 150 MG TB24 tablet    Increased Bupropion dose to 300 mg , pt wishes to take 150 mg in the morning and 150 mg in the evening       Other Relevant Orders    Ambulatory referral/consult to Primary Care Behavioral Health " (Non-Opioids)    Cervical pain (neck)        Relevant Medications    tiZANidine (ZANAFLEX) 4 MG tablet    Insomnia, unspecified type        Relevant Medications    tiZANidine (ZANAFLEX) 4 MG tablet    Screen for colon cancer        Relevant Orders    Cologuard Screening (Multitarget Stool DNA)          Reviewed risks, benefits, and appropriate medication use prescribed  Discussed LS changes as appropriate   Reviewed cancer screening guidelines   Advised to keep speciality and follow up appointments as scheduled   Reviewed ER precautions   Verbalized understanding and is agreeable to plan      Follow Up:   Follow up in about 3 months (around 2/18/2025), or 2 week nurse visit telephone BP check , as needed if s/s worsen or fail to improve.    Other Orders Placed This Visit:  Orders Placed This Encounter   Procedures    Cologuard Screening (Multitarget Stool DNA)    Ambulatory referral/consult to Primary Care Behavioral Health (Non-Opioids)           MICHAEL Alva-BC

## 2024-11-15 NOTE — TELEPHONE ENCOUNTER
----- Message from Flory sent at 11/15/2024  8:48 AM CST -----  Regarding: Call requested  Contact: [phone]  Hi,     Who called: The pt       Reason: Pt called to request a call to discuss the orders that was to be sent to [facility]. Pls call the pt at [phone]      Provider's name:Dr. Navarro      Additional Information: Thank you.

## 2024-11-18 ENCOUNTER — PATIENT MESSAGE (OUTPATIENT)
Dept: BEHAVIORAL HEALTH | Facility: CLINIC | Age: 49
End: 2024-11-18
Payer: COMMERCIAL

## 2024-11-18 ENCOUNTER — OFFICE VISIT (OUTPATIENT)
Dept: PRIMARY CARE CLINIC | Facility: CLINIC | Age: 49
End: 2024-11-18
Payer: COMMERCIAL

## 2024-11-18 VITALS
OXYGEN SATURATION: 99 % | BODY MASS INDEX: 28.23 KG/M2 | DIASTOLIC BLOOD PRESSURE: 78 MMHG | SYSTOLIC BLOOD PRESSURE: 148 MMHG | HEART RATE: 58 BPM | HEIGHT: 63 IN | WEIGHT: 159.31 LBS

## 2024-11-18 DIAGNOSIS — Z12.39 ENCOUNTER FOR SCREENING FOR MALIGNANT NEOPLASM OF BREAST, UNSPECIFIED SCREENING MODALITY: ICD-10-CM

## 2024-11-18 DIAGNOSIS — F32.1 CURRENT MODERATE EPISODE OF MAJOR DEPRESSIVE DISORDER, UNSPECIFIED WHETHER RECURRENT: ICD-10-CM

## 2024-11-18 DIAGNOSIS — Z12.11 SCREEN FOR COLON CANCER: ICD-10-CM

## 2024-11-18 DIAGNOSIS — I10 HYPERTENSION, UNSPECIFIED TYPE: Primary | ICD-10-CM

## 2024-11-18 DIAGNOSIS — I50.30 HEART FAILURE WITH PRESERVED EJECTION FRACTION, UNSPECIFIED HF CHRONICITY: ICD-10-CM

## 2024-11-18 DIAGNOSIS — G47.00 INSOMNIA, UNSPECIFIED TYPE: ICD-10-CM

## 2024-11-18 DIAGNOSIS — M54.2 CERVICAL PAIN (NECK): ICD-10-CM

## 2024-11-18 PROCEDURE — 3008F BODY MASS INDEX DOCD: CPT | Mod: CPTII,S$GLB,,

## 2024-11-18 PROCEDURE — 1159F MED LIST DOCD IN RCRD: CPT | Mod: CPTII,S$GLB,,

## 2024-11-18 PROCEDURE — 99999 PR PBB SHADOW E&M-EST. PATIENT-LVL V: CPT | Mod: PBBFAC,,,

## 2024-11-18 PROCEDURE — 1160F RVW MEDS BY RX/DR IN RCRD: CPT | Mod: CPTII,S$GLB,,

## 2024-11-18 PROCEDURE — 3078F DIAST BP <80 MM HG: CPT | Mod: CPTII,S$GLB,,

## 2024-11-18 PROCEDURE — 99214 OFFICE O/P EST MOD 30 MIN: CPT | Mod: S$GLB,,,

## 2024-11-18 PROCEDURE — 4010F ACE/ARB THERAPY RXD/TAKEN: CPT | Mod: CPTII,S$GLB,,

## 2024-11-18 PROCEDURE — 3077F SYST BP >= 140 MM HG: CPT | Mod: CPTII,S$GLB,,

## 2024-11-18 PROCEDURE — 3044F HG A1C LEVEL LT 7.0%: CPT | Mod: CPTII,S$GLB,,

## 2024-11-18 RX ORDER — VALSARTAN 320 MG/1
320 TABLET ORAL DAILY
Qty: 90 TABLET | Refills: 3 | Status: SHIPPED | OUTPATIENT
Start: 2024-11-18 | End: 2024-11-21

## 2024-11-18 RX ORDER — TIZANIDINE 4 MG/1
4 TABLET ORAL NIGHTLY
Qty: 90 TABLET | Refills: 1 | Status: SHIPPED | OUTPATIENT
Start: 2024-11-18

## 2024-11-18 RX ORDER — BUPROPION HYDROCHLORIDE 150 MG/1
300 TABLET ORAL DAILY
Qty: 90 TABLET | Refills: 1 | Status: SHIPPED | OUTPATIENT
Start: 2024-11-18

## 2024-11-19 ENCOUNTER — TELEPHONE (OUTPATIENT)
Dept: BEHAVIORAL HEALTH | Facility: CLINIC | Age: 49
End: 2024-11-19
Payer: COMMERCIAL

## 2024-11-19 NOTE — PROGRESS NOTES
CHW reached out to pt who completed intake assessments via patient portal. No answer, LVM and sent a portal message to pls call to schedule a new pt appt.  Behavioral Health Community Health Worker  Initial Assessment  Completed by:  Mary Coppola     Date:  11/19/2024    Patient Enrollment in Behavioral Health Program:  Patient verbalized understanding of Behavioral Health Integration services to include:  Patient understands that CHW, LCSW, PharmD and consulting Psychiatrist are members of the care team working collaboratively with his/her primary care provider: Yes  Patient understands that activation of their [facility] patient portal account is required for accessing the full scope of team services: Yes  Patient understands that some counseling sessions may occur via video: Yes  Clinic visits with the psychiatrist may be subject to a co-pay based on your insurance: Yes  Patient consents to enroll in BHI program: Yes    Assessments     Single Item Health Literacy Scale:  Never     Promis 10:  Promis 10 Responses  In general, would you say your health is: Good  In general, would you say your quality of life is: Very good  In general, how would you rate your physical health?: Fair  In general, how would you rate your mental health, including your mood and your ability to think?: Fair  In general, how would you rate your satisfaction with your social activities and relationships?: Fair  In general, please rate how well you carry out your usual social activities and roles. (This includes activities at home, at work and in your community, and responsibilities as a parent, child, spouse, employee, friend, etc.): Good  To what extent are you able to carry out your everyday physical activities such as walking, climbing stairs, carrying groceries, or moving a chair? : Moderately  How often have you been bothered by emotional problems such as feeling anxious, depressed or irritable?: Often  In the past 7 days, how would you  rate your fatigue on average?: Mild  In the past 7 days, on a scale of 0 to 10 (where 0 is no pain and 10 is the worst pain imaginable) how would you rate your pain on average?: 10  Global Physical Health: 17  Global Mental health Score: 12    Depression PHQ:      11/18/2024     8:45 PM   PHQ9   Little interest or pleasure in doing things 1    Feeling down, depressed, or hopeless 1    Trouble falling or staying asleep, or sleeping too much 1    Feeling tired or having little energy 1    Poor appetite or overeating 1    Feeling bad about yourself - or that you are a failure or have let yourself or your family down 1    Trouble concentrating on things, such as reading the newspaper or watching television 1    Moving or speaking so slowly that other people could have noticed. Or the opposite - being so fidgety or restless that you have been moving around a lot more than usual 0    PHQ-9 Total Score 7        Patient-reported         Generalized Anxiety Disorder 7-Item Scale:      11/18/2024     8:46 PM   GAD7   1. Feeling nervous, anxious, or on edge? 1    2. Not being able to stop or control worrying? 1    3. Worrying too much about different things? 1    4. Trouble relaxing? 1    5. Being so restless that it is hard to sit still? 0    6. Becoming easily annoyed or irritable? 1    7. Feeling afraid as if something awful might happen? 2    8. If you checked off any problems, how difficult have these problems made it for you to do your work, take care of things at home, or get along with other people? 1    SUBHA-7 Score 7        Patient-reported       History     Social History     Socioeconomic History    Marital status:    Tobacco Use    Smoking status: Never    Smokeless tobacco: Never   Substance and Sexual Activity    Alcohol use: No    Drug use: No    Sexual activity: Yes     Partners: Male     Comment: single parter     Social Drivers of Health     Financial Resource Strain: Low Risk  (7/30/2023)    Overall  "Financial Resource Strain (CARDIA)     Difficulty of Paying Living Expenses: Not very hard   Food Insecurity: No Food Insecurity (7/30/2023)    Hunger Vital Sign     Worried About Running Out of Food in the Last Year: Never true     Ran Out of Food in the Last Year: Never true   Transportation Needs: No Transportation Needs (7/30/2023)    PRAPARE - Transportation     Lack of Transportation (Medical): No     Lack of Transportation (Non-Medical): No   Physical Activity: Insufficiently Active (7/30/2023)    Exercise Vital Sign     Days of Exercise per Week: 3 days     Minutes of Exercise per Session: 30 min   Stress: No Stress Concern Present (7/30/2023)    [employer] [place] [employer] - Occupational Stress Questionnaire     Feeling of Stress : Only a little   Housing Stability: Low Risk  (7/30/2023)    Housing Stability Vital Sign     Unable to Pay for Housing in the Last Year: No     Number of Places Lived in the Last Year: 1     Unstable Housing in the Last Year: No       Call Summary     Patient was referred to the BHI (Non-opioid) program by Primary Care Provider, Dr. Anibal Navarro.  CHW contacted Lluvia Sheehan who reports depression and anxiety that limits her activities of daily living (ADLs).   Patient scored "7" on the PHQ8 and "7" on the SUBHA 7. Based on these scores patient is eligible for the Behavioral Health Integration (Non-opioid) Program. CHW completed the intake and scheduled a new patient virtual appointment for patient with Randal Matias LPC on Monday, 12/2/2024 at 10:00am.        "

## 2024-11-20 ENCOUNTER — PATIENT MESSAGE (OUTPATIENT)
Dept: PRIMARY CARE CLINIC | Facility: CLINIC | Age: 49
End: 2024-11-20
Payer: COMMERCIAL

## 2024-11-20 DIAGNOSIS — I50.30 HEART FAILURE WITH PRESERVED EJECTION FRACTION, UNSPECIFIED HF CHRONICITY: ICD-10-CM

## 2024-11-20 DIAGNOSIS — I10 HYPERTENSION, UNSPECIFIED TYPE: ICD-10-CM

## 2024-11-21 DIAGNOSIS — I10 HYPERTENSION, UNSPECIFIED TYPE: Primary | ICD-10-CM

## 2024-11-21 RX ORDER — VALSARTAN 320 MG/1
320 TABLET ORAL DAILY
Qty: 90 TABLET | Refills: 0 | OUTPATIENT
Start: 2024-11-21

## 2024-11-21 RX ORDER — LOSARTAN POTASSIUM 50 MG/1
50 TABLET ORAL 2 TIMES DAILY
Qty: 180 TABLET | Refills: 3 | Status: SHIPPED | OUTPATIENT
Start: 2024-11-21 | End: 2025-11-21

## 2024-11-21 NOTE — TELEPHONE ENCOUNTER
Refill Routing Note   Medication(s) are not appropriate for processing by [facility] for the following reason(s):        Non-participating provider    [facility] action(s):  Route        Medication Therapy Plan: Patient requesting for med to be changed to Losartan 50 mg twice daily.      Appointments  past 12m or future 3m with PCP    Date Provider   Last Visit   11/18/2024 Allan Phelps FNP-BC   Next Visit   2/18/2025 Allan Phelps FNP-BC   ED visits in past 90 days: 0        Note composed:10:14 AM 11/21/2024

## 2024-11-21 NOTE — PROGRESS NOTES
Pt recently switched from losartan 100mg to valsartan 320mg for better BP control/ coverage.Pt states valsartan script is too expensive. Pt request taking losartan 50mg BID. New script with updated instructions sent to [facility] pharmacy

## 2024-11-22 ENCOUNTER — PATIENT MESSAGE (OUTPATIENT)
Dept: PRIMARY CARE CLINIC | Facility: CLINIC | Age: 49
End: 2024-11-22
Payer: COMMERCIAL

## 2024-11-29 ENCOUNTER — TELEPHONE (OUTPATIENT)
Dept: BEHAVIORAL HEALTH | Facility: CLINIC | Age: 49
End: 2024-11-29
Payer: COMMERCIAL
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
## 2024-11-29 NOTE — PROGRESS NOTES
CHW reached out to new pt to remind her of virtual appointment with Randal Matias LPC Monday. Pt confirmed the appointment.

## 2024-12-02 ENCOUNTER — PATIENT MESSAGE (OUTPATIENT)
Dept: BEHAVIORAL HEALTH | Facility: CLINIC | Age: 49
End: 2024-12-02
Payer: COMMERCIAL

## 2024-12-02 ENCOUNTER — CLINICAL SUPPORT (OUTPATIENT)
Dept: BEHAVIORAL HEALTH | Facility: CLINIC | Age: 49
End: 2024-12-02
Payer: COMMERCIAL

## 2024-12-02 DIAGNOSIS — F43.10 POSTTRAUMATIC STRESS DISORDER: Primary | ICD-10-CM

## 2024-12-02 DIAGNOSIS — F32.1 CURRENT MODERATE EPISODE OF MAJOR DEPRESSIVE DISORDER, UNSPECIFIED WHETHER RECURRENT: ICD-10-CM

## 2024-12-02 PROCEDURE — 90791 PSYCH DIAGNOSTIC EVALUATION: CPT | Mod: 95,,, | Performed by: COUNSELOR

## 2024-12-19 ENCOUNTER — PATIENT MESSAGE (OUTPATIENT)
Dept: ADMINISTRATIVE | Facility: HOSPITAL | Age: 49
End: 2024-12-19
Payer: COMMERCIAL